# Patient Record
Sex: FEMALE | Race: WHITE | Employment: OTHER | URBAN - NONMETROPOLITAN AREA
[De-identification: names, ages, dates, MRNs, and addresses within clinical notes are randomized per-mention and may not be internally consistent; named-entity substitution may affect disease eponyms.]

---

## 2017-01-04 ENCOUNTER — TELEPHONE (OUTPATIENT)
Dept: PHYSICAL MEDICINE AND REHAB | Age: 67
End: 2017-01-04

## 2017-01-09 ENCOUNTER — OFFICE VISIT (OUTPATIENT)
Dept: UROLOGY | Age: 67
End: 2017-01-09

## 2017-01-09 VITALS
DIASTOLIC BLOOD PRESSURE: 80 MMHG | WEIGHT: 177 LBS | BODY MASS INDEX: 31.36 KG/M2 | HEIGHT: 63 IN | SYSTOLIC BLOOD PRESSURE: 120 MMHG

## 2017-01-09 DIAGNOSIS — R31.9 HEMATURIA: Primary | ICD-10-CM

## 2017-01-09 LAB
BILIRUBIN URINE: NEGATIVE
BLOOD URINE, POC: NORMAL
CHARACTER, URINE: CLEAR
COLOR, URINE: YELLOW
GLUCOSE URINE: NEGATIVE MG/DL
KETONES, URINE: NEGATIVE
LEUKOCYTE CLUMPS, URINE: NORMAL
NITRITE, URINE: POSITIVE
PH, URINE: 5
PROTEIN, URINE: NEGATIVE MG/DL
SPECIFIC GRAVITY, URINE: 1.02 (ref 1–1.03)
UROBILINOGEN, URINE: 0.2 EU/DL

## 2017-01-09 PROCEDURE — 99214 OFFICE O/P EST MOD 30 MIN: CPT | Performed by: UROLOGY

## 2017-01-09 PROCEDURE — 81003 URINALYSIS AUTO W/O SCOPE: CPT | Performed by: UROLOGY

## 2017-01-09 ASSESSMENT — ENCOUNTER SYMPTOMS
EYE REDNESS: 0
COLOR CHANGE: 0
ABDOMINAL PAIN: 0
EYE ITCHING: 0
CHEST TIGHTNESS: 0
BACK PAIN: 1
CONSTIPATION: 1
SHORTNESS OF BREATH: 0

## 2017-05-19 ENCOUNTER — PROCEDURE VISIT (OUTPATIENT)
Dept: PHYSICAL MEDICINE AND REHAB | Age: 67
End: 2017-05-19

## 2017-05-19 DIAGNOSIS — M54.2 NECK PAIN: ICD-10-CM

## 2017-05-19 DIAGNOSIS — R20.0 BILATERAL HAND NUMBNESS: ICD-10-CM

## 2017-05-19 DIAGNOSIS — G56.03 BILATERAL CARPAL TUNNEL SYNDROME: Primary | ICD-10-CM

## 2017-05-19 DIAGNOSIS — M54.12 CERVICAL RADICULOPATHY: ICD-10-CM

## 2017-05-19 PROCEDURE — 95886 MUSC TEST DONE W/N TEST COMP: CPT | Performed by: PSYCHIATRY & NEUROLOGY

## 2017-05-19 PROCEDURE — 95911 NRV CNDJ TEST 9-10 STUDIES: CPT | Performed by: PSYCHIATRY & NEUROLOGY

## 2017-06-19 ENCOUNTER — OFFICE VISIT (OUTPATIENT)
Dept: PHYSICAL MEDICINE AND REHAB | Age: 67
End: 2017-06-19

## 2017-06-19 VITALS
SYSTOLIC BLOOD PRESSURE: 126 MMHG | DIASTOLIC BLOOD PRESSURE: 79 MMHG | HEIGHT: 64 IN | WEIGHT: 183.8 LBS | HEART RATE: 76 BPM | BODY MASS INDEX: 31.38 KG/M2

## 2017-06-19 DIAGNOSIS — I69.30 HISTORY OF STROKE WITH RESIDUAL DEFICIT: Primary | ICD-10-CM

## 2017-06-19 PROCEDURE — 99213 OFFICE O/P EST LOW 20 MIN: CPT | Performed by: PSYCHIATRY & NEUROLOGY

## 2017-06-22 ENCOUNTER — TELEPHONE (OUTPATIENT)
Dept: PHYSICAL MEDICINE AND REHAB | Age: 67
End: 2017-06-22

## 2017-06-22 DIAGNOSIS — R42 DIZZINESS: Primary | ICD-10-CM

## 2017-07-10 ENCOUNTER — OFFICE VISIT (OUTPATIENT)
Dept: UROLOGY | Age: 67
End: 2017-07-10

## 2017-07-10 VITALS
BODY MASS INDEX: 31.24 KG/M2 | WEIGHT: 183 LBS | HEIGHT: 64 IN | DIASTOLIC BLOOD PRESSURE: 80 MMHG | SYSTOLIC BLOOD PRESSURE: 128 MMHG

## 2017-07-10 DIAGNOSIS — R31.9 HEMATURIA: Primary | ICD-10-CM

## 2017-07-10 LAB
BILIRUBIN URINE: NEGATIVE
BLOOD URINE, POC: NEGATIVE
CHARACTER, URINE: CLEAR
COLOR, URINE: YELLOW
GLUCOSE URINE: NEGATIVE MG/DL
KETONES, URINE: NEGATIVE
LEUKOCYTE CLUMPS, URINE: NEGATIVE
NITRITE, URINE: NEGATIVE
PH, URINE: 5
PROTEIN, URINE: NEGATIVE MG/DL
SPECIFIC GRAVITY, URINE: 1.01 (ref 1–1.03)
UROBILINOGEN, URINE: 0.2 EU/DL

## 2017-07-10 PROCEDURE — 81003 URINALYSIS AUTO W/O SCOPE: CPT | Performed by: UROLOGY

## 2017-07-10 PROCEDURE — 99214 OFFICE O/P EST MOD 30 MIN: CPT | Performed by: UROLOGY

## 2017-07-10 ASSESSMENT — ENCOUNTER SYMPTOMS
COLOR CHANGE: 0
ABDOMINAL PAIN: 0
CHEST TIGHTNESS: 0
BACK PAIN: 1
EYE REDNESS: 0
SHORTNESS OF BREATH: 0
FACIAL SWELLING: 0
EYE PAIN: 0
NAUSEA: 0

## 2017-10-05 ENCOUNTER — HOSPITAL ENCOUNTER (OUTPATIENT)
Dept: WOMENS IMAGING | Age: 67
Discharge: HOME | End: 2017-10-05
Payer: MEDICARE

## 2017-10-05 DIAGNOSIS — Z12.31 VISIT FOR SCREENING MAMMOGRAM: ICD-10-CM

## 2017-10-05 PROCEDURE — 77063 BREAST TOMOSYNTHESIS BI: CPT

## 2018-05-29 ENCOUNTER — HOSPITAL ENCOUNTER (OUTPATIENT)
Dept: MRI IMAGING | Age: 68
Discharge: HOME | End: 2018-05-29
Payer: MEDICARE

## 2018-05-29 DIAGNOSIS — R22.1 PARAPHARYNGEAL SPACE MASS: ICD-10-CM

## 2018-05-29 LAB — POC CREATININE WHOLE BLOOD: 0.7 MG/DL (ref 0.5–1.2)

## 2018-05-29 PROCEDURE — 70543 MRI ORBT/FAC/NCK W/O &W/DYE: CPT

## 2018-05-29 PROCEDURE — 6360000004 HC RX CONTRAST MEDICATION: Performed by: OTOLARYNGOLOGY

## 2018-05-29 PROCEDURE — A9579 GAD-BASE MR CONTRAST NOS,1ML: HCPCS | Performed by: OTOLARYNGOLOGY

## 2018-05-29 PROCEDURE — 82565 ASSAY OF CREATININE: CPT

## 2018-05-29 RX ADMIN — GADOTERIDOL 15 ML: 279.3 INJECTION, SOLUTION INTRAVENOUS at 13:04

## 2018-06-07 ENCOUNTER — TELEPHONE (OUTPATIENT)
Dept: UROLOGY | Age: 68
End: 2018-06-07

## 2018-07-16 ENCOUNTER — OFFICE VISIT (OUTPATIENT)
Dept: UROLOGY | Age: 68
End: 2018-07-16
Payer: MEDICARE

## 2018-07-16 VITALS
DIASTOLIC BLOOD PRESSURE: 84 MMHG | WEIGHT: 182 LBS | SYSTOLIC BLOOD PRESSURE: 120 MMHG | BODY MASS INDEX: 32.25 KG/M2 | HEIGHT: 63 IN

## 2018-07-16 DIAGNOSIS — R31.29 MICROHEMATURIA: Primary | ICD-10-CM

## 2018-07-16 DIAGNOSIS — R33.9 URINARY RETENTION: ICD-10-CM

## 2018-07-16 DIAGNOSIS — R33.9 INCOMPLETE BLADDER EMPTYING: ICD-10-CM

## 2018-07-16 DIAGNOSIS — R35.0 URINARY FREQUENCY: ICD-10-CM

## 2018-07-16 LAB
BILIRUBIN URINE: NEGATIVE
BLOOD URINE, POC: ABNORMAL
CHARACTER, URINE: CLEAR
COLOR, URINE: YELLOW
GLUCOSE URINE: NEGATIVE MG/DL
KETONES, URINE: NEGATIVE
LEUKOCYTE CLUMPS, URINE: ABNORMAL
NITRITE, URINE: NEGATIVE
PH, URINE: 5.5
POST VOID RESIDUAL (PVR): 226 ML
PROTEIN, URINE: NEGATIVE MG/DL
SPECIFIC GRAVITY, URINE: 1.01 (ref 1–1.03)
UROBILINOGEN, URINE: 0.2 EU/DL

## 2018-07-16 PROCEDURE — 81003 URINALYSIS AUTO W/O SCOPE: CPT | Performed by: NURSE PRACTITIONER

## 2018-07-16 PROCEDURE — 51798 US URINE CAPACITY MEASURE: CPT | Performed by: NURSE PRACTITIONER

## 2018-07-16 PROCEDURE — 99213 OFFICE O/P EST LOW 20 MIN: CPT | Performed by: NURSE PRACTITIONER

## 2018-07-16 NOTE — PROGRESS NOTES
Results   Component Value Date    BUN 12 09/13/2016    CREATININE 0.7 01/22/2017    CREATININE 0.6 09/13/2016       Radiology  No recent imaging       Assessment & Plan:     Incomplete Bladder emptying  Urinary retention  Microhematuria     Luba f/u today for microhematuria, hx of gross hematuria. Denies any new episodes of gross hematuria, urinalysis shows trace blood & leukocytes. Will send for culture and cytology today. PVR elevated at 226 ml. Discussed need to start straight cath TID. She is willing to perform and be taught how to do it, but she wishes to not have it done today in the office. She will record residuals in voiding diary. Re-evaluate in 6 weeks. Return in about 6 weeks (around 8/27/2018) for Incomplete bladder emptying .     PAULINO Hill  Urology

## 2018-07-17 LAB
ORGANISM: ABNORMAL
URINE CULTURE, ROUTINE: ABNORMAL

## 2018-07-18 ENCOUNTER — TELEPHONE (OUTPATIENT)
Dept: UROLOGY | Age: 68
End: 2018-07-18

## 2018-07-18 RX ORDER — SULFAMETHOXAZOLE AND TRIMETHOPRIM 800; 160 MG/1; MG/1
1 TABLET ORAL 2 TIMES DAILY
Qty: 10 TABLET | Refills: 0 | Status: SHIPPED | OUTPATIENT
Start: 2018-07-18 | End: 2018-07-23

## 2018-07-19 ENCOUNTER — TELEPHONE (OUTPATIENT)
Dept: UROLOGY | Age: 68
End: 2018-07-19

## 2018-08-20 ENCOUNTER — TELEPHONE (OUTPATIENT)
Dept: UROLOGY | Age: 68
End: 2018-08-20

## 2018-08-20 NOTE — TELEPHONE ENCOUNTER
Patient called and said that she needed to cancel her appointment today. She said that she is feeling good and not having to self cath anymore. She said that she sometimes feels an ache in her lower back and she did so a home UTI kit to see if she had an infection and it said she did. She said that she also  Was having some dental work done and her dentist put her on an antibiotic and the symptoms of the UTI went away. There  is an active order for a urine culture in the system so I am faxing it over to [facility] on [address] and she is getting it done tomorrow.

## 2018-08-25 LAB — URINE CULTURE, ROUTINE: NORMAL

## 2018-08-27 ENCOUNTER — TELEPHONE (OUTPATIENT)
Dept: UROLOGY | Age: 68
End: 2018-08-27

## 2018-11-10 ENCOUNTER — OFFICE VISIT (OUTPATIENT)
Dept: FAMILY MEDICINE CLINIC | Age: 68
End: 2018-11-10
Payer: MEDICARE

## 2018-11-10 VITALS
WEIGHT: 175 LBS | HEIGHT: 63 IN | SYSTOLIC BLOOD PRESSURE: 122 MMHG | DIASTOLIC BLOOD PRESSURE: 86 MMHG | RESPIRATION RATE: 16 BRPM | TEMPERATURE: 99.1 F | BODY MASS INDEX: 31.01 KG/M2 | HEART RATE: 76 BPM

## 2018-11-10 DIAGNOSIS — I10 ESSENTIAL HYPERTENSION: ICD-10-CM

## 2018-11-10 DIAGNOSIS — R11.0 NAUSEA: ICD-10-CM

## 2018-11-10 DIAGNOSIS — R10.9 LEFT FLANK PAIN: ICD-10-CM

## 2018-11-10 DIAGNOSIS — G47.00 INSOMNIA, UNSPECIFIED TYPE: ICD-10-CM

## 2018-11-10 DIAGNOSIS — I69.30 LATE EFFECT OF CEREBROVASCULAR ACCIDENT (CVA): ICD-10-CM

## 2018-11-10 DIAGNOSIS — Z91.81 AT HIGH RISK FOR FALLS: Primary | ICD-10-CM

## 2018-11-10 DIAGNOSIS — M15.8 OTHER OSTEOARTHRITIS INVOLVING MULTIPLE JOINTS: ICD-10-CM

## 2018-11-10 PROCEDURE — 99204 OFFICE O/P NEW MOD 45 MIN: CPT | Performed by: FAMILY MEDICINE

## 2018-11-10 RX ORDER — FLUTICASONE PROPIONATE 50 MCG
1 SPRAY, SUSPENSION (ML) NASAL DAILY
COMMUNITY
End: 2020-02-04 | Stop reason: ALTCHOICE

## 2018-11-10 RX ORDER — PROMETHAZINE HYDROCHLORIDE 25 MG/1
25 TABLET ORAL EVERY 6 HOURS PRN
Qty: 120 TABLET | Refills: 3 | Status: SHIPPED | OUTPATIENT
Start: 2018-11-10 | End: 2020-02-04 | Stop reason: ALTCHOICE

## 2018-11-10 RX ORDER — PHENAZOPYRIDINE HYDROCHLORIDE 200 MG/1
200 TABLET, FILM COATED ORAL 3 TIMES DAILY PRN
COMMUNITY
End: 2020-02-04 | Stop reason: ALTCHOICE

## 2018-11-10 RX ORDER — OXYCODONE HYDROCHLORIDE AND ACETAMINOPHEN 5; 325 MG/1; MG/1
1-2 TABLET ORAL EVERY 4 HOURS PRN
Qty: 42 TABLET | Refills: 0 | Status: CANCELLED | OUTPATIENT
Start: 2018-11-10 | End: 2018-11-24

## 2018-11-10 RX ORDER — MECLIZINE HYDROCHLORIDE 25 MG/1
25 TABLET ORAL 3 TIMES DAILY PRN
Qty: 270 TABLET | Refills: 3 | Status: SHIPPED | OUTPATIENT
Start: 2018-11-10 | End: 2020-02-04

## 2018-11-10 RX ORDER — SPIRONOLACTONE 25 MG/1
25 TABLET ORAL DAILY
Qty: 90 TABLET | Refills: 3 | Status: SHIPPED | OUTPATIENT
Start: 2018-11-10 | End: 2019-11-13 | Stop reason: SDUPTHER

## 2018-11-10 RX ORDER — ALPRAZOLAM 0.25 MG/1
0.25 TABLET ORAL NIGHTLY PRN
Qty: 90 TABLET | Refills: 0 | Status: SHIPPED | OUTPATIENT
Start: 2018-11-10 | End: 2019-02-08

## 2018-11-10 RX ORDER — SPIRONOLACTONE 25 MG/1
25 TABLET ORAL DAILY
COMMUNITY
End: 2018-11-10 | Stop reason: SDUPTHER

## 2018-11-10 RX ORDER — PROMETHAZINE HYDROCHLORIDE 25 MG/1
25 TABLET ORAL EVERY 6 HOURS PRN
COMMUNITY
End: 2018-11-10 | Stop reason: SDUPTHER

## 2018-11-10 ASSESSMENT — PATIENT HEALTH QUESTIONNAIRE - PHQ9
SUM OF ALL RESPONSES TO PHQ QUESTIONS 1-9: 0
SUM OF ALL RESPONSES TO PHQ9 QUESTIONS 1 & 2: 0
1. LITTLE INTEREST OR PLEASURE IN DOING THINGS: 0
2. FEELING DOWN, DEPRESSED OR HOPELESS: 0
SUM OF ALL RESPONSES TO PHQ QUESTIONS 1-9: 0

## 2018-11-10 ASSESSMENT — ENCOUNTER SYMPTOMS
SHORTNESS OF BREATH: 0
NAUSEA: 0
COUGH: 0
EYE REDNESS: 0
CONSTIPATION: 0
VOMITING: 0
BLOOD IN STOOL: 0
EYE DISCHARGE: 0
SORE THROAT: 0
DIARRHEA: 0
EYE PAIN: 0
WHEEZING: 0
BACK PAIN: 1

## 2018-11-19 ENCOUNTER — TELEPHONE (OUTPATIENT)
Dept: FAMILY MEDICINE CLINIC | Age: 68
End: 2018-11-19

## 2018-11-19 DIAGNOSIS — G11.9 CEREBELLAR ATAXIA (HCC): Primary | ICD-10-CM

## 2018-11-19 DIAGNOSIS — I63.9 CEREBELLAR STROKE, ACUTE (HCC): ICD-10-CM

## 2018-12-03 ENCOUNTER — TELEPHONE (OUTPATIENT)
Dept: FAMILY MEDICINE CLINIC | Age: 68
End: 2018-12-03

## 2018-12-03 ENCOUNTER — HOSPITAL ENCOUNTER (OUTPATIENT)
Dept: CT IMAGING | Age: 68
Discharge: HOME | End: 2018-12-03
Payer: MEDICARE

## 2018-12-03 DIAGNOSIS — K57.92 DIVERTICULITIS: Primary | ICD-10-CM

## 2018-12-03 DIAGNOSIS — R10.9 LEFT FLANK PAIN: ICD-10-CM

## 2018-12-03 DIAGNOSIS — R10.9 LEFT FLANK PAIN: Primary | ICD-10-CM

## 2018-12-03 PROCEDURE — 74176 CT ABD & PELVIS W/O CONTRAST: CPT

## 2018-12-03 RX ORDER — AMOXICILLIN AND CLAVULANATE POTASSIUM 875; 125 MG/1; MG/1
1 TABLET, FILM COATED ORAL 2 TIMES DAILY
Qty: 20 TABLET | Refills: 0 | Status: SHIPPED | OUTPATIENT
Start: 2018-12-03 | End: 2019-05-13 | Stop reason: SDUPTHER

## 2018-12-03 NOTE — TELEPHONE ENCOUNTER
Pt informed and verbalized understanding. Pt has had LLQ pain, she has been constipated, felt feverish and had chills off and on for months, pt doesn't have a thermometer. No N,V, or Diarrhea.  Pt states she had bright red blood in her stool 2 days ago

## 2018-12-15 ENCOUNTER — OFFICE VISIT (OUTPATIENT)
Dept: FAMILY MEDICINE CLINIC | Age: 68
End: 2018-12-15
Payer: MEDICARE

## 2018-12-15 VITALS
RESPIRATION RATE: 12 BRPM | SYSTOLIC BLOOD PRESSURE: 132 MMHG | BODY MASS INDEX: 29.37 KG/M2 | HEART RATE: 76 BPM | TEMPERATURE: 98 F | WEIGHT: 172 LBS | HEIGHT: 64 IN | DIASTOLIC BLOOD PRESSURE: 72 MMHG

## 2018-12-15 DIAGNOSIS — K57.32 DIVERTICULITIS OF LARGE INTESTINE WITHOUT PERFORATION OR ABSCESS WITHOUT BLEEDING: Primary | ICD-10-CM

## 2018-12-15 PROCEDURE — 99213 OFFICE O/P EST LOW 20 MIN: CPT | Performed by: FAMILY MEDICINE

## 2018-12-15 RX ORDER — METRONIDAZOLE 500 MG/1
500 TABLET ORAL 3 TIMES DAILY
Qty: 21 TABLET | Refills: 0 | Status: SHIPPED | OUTPATIENT
Start: 2018-12-15 | End: 2018-12-22

## 2018-12-15 RX ORDER — METRONIDAZOLE 500 MG/1
500 TABLET ORAL 3 TIMES DAILY
Qty: 21 TABLET | Refills: 0 | Status: SHIPPED | OUTPATIENT
Start: 2018-12-15 | End: 2018-12-15 | Stop reason: SDUPTHER

## 2018-12-15 RX ORDER — CIPROFLOXACIN 500 MG/1
500 TABLET, FILM COATED ORAL 2 TIMES DAILY
Qty: 14 TABLET | Refills: 0 | Status: SHIPPED | OUTPATIENT
Start: 2018-12-15 | End: 2018-12-22

## 2018-12-15 RX ORDER — CIPROFLOXACIN 500 MG/1
500 TABLET, FILM COATED ORAL 2 TIMES DAILY
Qty: 14 TABLET | Refills: 0 | Status: SHIPPED | OUTPATIENT
Start: 2018-12-15 | End: 2018-12-15 | Stop reason: SDUPTHER

## 2018-12-15 NOTE — PROGRESS NOTES
Candida Weinberg is a 80 y.o. female that presents for     Chief Complaint   Patient presents with    Results     discuss CT scan       /72   Pulse 76   Temp 98 °F (36.7 °C) (Oral)   Resp 12   Ht 5' 4\" (1.626 m)   Wt 172 lb (78 kg)   LMP 11/06/1982   BMI 29.52 kg/m²       HPI:    Patient presenting follow up of CT scan of abdomen. We were initially evaluating for kidney stones, however, findings appeared more consistent with diverticulitis. Patient had noted LLQ and flank pain as well as chills. I started her on Augmentin at that time. Patient notes that she is still having some LLQ pain. She has been having some diarrhea with the augmentin, having 3-4 loose BMs per day, but seems to be improving. Appetite is a little decreased, she is having some nausea. Notes that if she does eat, she feels bloated. No fever recently, but has some intermittent chills. CT Abd/Pelvis 12/3/18:  Impression   1. No acute intra-abdominal findings.    2. Sigmoid colon diverticula with adjacent pericolonic inflammatory stranding suspicious for acute diverticulitis.       Final report electronically signed by Dr. Humaira Barone on 12/3/2018 2:11 PM         Health Maintenance   Topic Date Due    Hepatitis C screen  1950    DTaP/Tdap/Td vaccine (1 - Tdap) 01/08/1970    Shingles Vaccine (1 of 2 - 2 Dose Series) 01/08/2001    Colon cancer screen colonoscopy  01/08/2001    Low dose CT lung screening  01/08/2006    DEXA (modify frequency per FRAX score)  01/08/2016    Pneumococcal low/med risk (1 of 2 - PCV13) 01/08/2016    Potassium monitoring  01/22/2018    Creatinine monitoring  01/22/2018    Flu vaccine (1) 09/01/2018    Breast cancer screen  10/05/2019    Lipid screen  08/08/2021       Past Medical History:   Diagnosis Date    Arthritis     Chronic back pain     Closed nondisplaced intertrochanteric fracture of left femur ([facility] [address] 8/6/2016    Coronary artery disease involving native coronary  Diabetes Father     Diabetes Sister     Diabetes Brother     Colon Polyps Brother     Cancer Paternal Grandmother         bladder    Stroke Paternal Grandfather          I have reviewed the patient's past medical history, past surgical history, allergies, medications, social and family history and I have made updates where appropriate. Review of Systems        PHYSICAL EXAM:  /72   Pulse 76   Temp 98 °F (36.7 °C) (Oral)   Resp 12   Ht 5' 4\" (1.626 m)   Wt 172 lb (78 kg)   LMP 11/06/1982   BMI 29.52 kg/m²     General Appearance: well developed and well- nourished, in no acute distress  Head: normocephalic and atraumatic  ENT: external ear and ear canal normal bilaterally, nose without deformity, nasal mucosa and turbinates normal without polyps, oropharynx normal, dentition is normal for age, no lipor gum lesions noted  Neck: supple and non-tender without mass, no thyromegaly or thyroid nodules, no cervical lymphadenopathy  Pulmonary/Chest: clear to auscultation bilaterally- no wheezes, rales or rhonchi, normal air movement, no respiratory distress or retractions  Cardiovascular: normal rate, regular rhythm, normal S1 and S2, no murmurs, rubs, clicks, or gallops, distal pulses intact  Abdomen: soft,+LLQ tenderness, non-distended, no rebound or guarding, no masses or hernias noted, no hepatospleenomegaly  Extremities: no cyanosis, clubbing or edema of the lower extremities  Psych:  Normal affect without evidence of depression oranxiety, insight and judgement are appropriate, memory appears intact  Skin: warm and dry, no rash or erythema      ASSESSMENT & PLAN  Evelyn Dallam was seen today for results. Diagnoses and all orders for this visit:    Diverticulitis of large intestine without perforation or abscess without bleeding  -     Discontinue: ciprofloxacin (CIPRO) 500 MG tablet; Take 1 tablet by mouth 2 times daily for 7 days  -     Discontinue: metroNIDAZOLE (FLAGYL) 500 MG tablet;  Take 1 tablet by mouth 3 times daily for 7 days  -     ciprofloxacin (CIPRO) 500 MG tablet; Take 1 tablet by mouth 2 times daily for 7 days  -     metroNIDAZOLE (FLAGYL) 500 MG tablet; Take 1 tablet by mouth 3 times daily for 7 days    -I suspect that she may still have the diverticular infection. Her vital signs are stable currently. Will do 1 week of cipro/flagyl. Will call in 5 days, if sx persisting, will refer over to GI.  -Patient advised to call immediately or go to ER if any worsening of symptoms      Return if symptoms worsen or fail to improve. Controlled Substances Monitoring:                     Heydi Norton received counseling on the following healthy behaviors: medication adherence  Reviewed prior labs and health maintenance. Continue current medications, diet and exercise. Discussed use, benefit, and side effects of prescribed medications. Barriers to medication compliance addressed. Patient given educational materials - see patient instructions. All patient questions answered. Patient voiced understanding.

## 2018-12-20 ENCOUNTER — TELEPHONE (OUTPATIENT)
Dept: FAMILY MEDICINE CLINIC | Age: 68
End: 2018-12-20

## 2018-12-20 NOTE — TELEPHONE ENCOUNTER
The flagyl and cause the nausea, I would recommend taking the antibiotics to completion and if the symptoms persist or worsen, please let me know.

## 2018-12-20 NOTE — TELEPHONE ENCOUNTER
Spoke to pt and she states she is doing ok and feeling a little better. She states the antibiotics have made her nauseous (she thinks it is the flagyl) but she has been taking thee meds as prescribed.

## 2019-01-07 ENCOUNTER — HOSPITAL ENCOUNTER (OUTPATIENT)
Dept: WOMENS IMAGING | Age: 69
Discharge: HOME | End: 2019-01-07
Payer: MEDICARE

## 2019-01-07 DIAGNOSIS — Z12.31 VISIT FOR SCREENING MAMMOGRAM: ICD-10-CM

## 2019-01-07 PROCEDURE — 77067 SCR MAMMO BI INCL CAD: CPT

## 2019-05-09 ENCOUNTER — HOSPITAL ENCOUNTER (OUTPATIENT)
Dept: MRI IMAGING | Age: 69
Discharge: HOME | End: 2019-05-09
Payer: MEDICARE

## 2019-05-09 DIAGNOSIS — R22.1 PARAPHARYNGEAL SPACE MASS: ICD-10-CM

## 2019-05-09 LAB — POC CREATININE WHOLE BLOOD: 0.8 MG/DL (ref 0.5–1.2)

## 2019-05-09 PROCEDURE — A9579 GAD-BASE MR CONTRAST NOS,1ML: HCPCS | Performed by: OTOLARYNGOLOGY

## 2019-05-09 PROCEDURE — 82565 ASSAY OF CREATININE: CPT

## 2019-05-09 PROCEDURE — 6360000004 HC RX CONTRAST MEDICATION: Performed by: OTOLARYNGOLOGY

## 2019-05-09 PROCEDURE — 70543 MRI ORBT/FAC/NCK W/O &W/DYE: CPT

## 2019-05-09 RX ADMIN — GADOTERIDOL 15 ML: 279.3 INJECTION, SOLUTION INTRAVENOUS at 17:00

## 2019-05-13 ENCOUNTER — OFFICE VISIT (OUTPATIENT)
Dept: FAMILY MEDICINE CLINIC | Age: 69
End: 2019-05-13
Payer: MEDICARE

## 2019-05-13 VITALS
DIASTOLIC BLOOD PRESSURE: 80 MMHG | RESPIRATION RATE: 16 BRPM | SYSTOLIC BLOOD PRESSURE: 132 MMHG | WEIGHT: 172 LBS | BODY MASS INDEX: 31.65 KG/M2 | HEIGHT: 62 IN | TEMPERATURE: 98.5 F | HEART RATE: 81 BPM

## 2019-05-13 DIAGNOSIS — K57.92 DIVERTICULITIS: ICD-10-CM

## 2019-05-13 DIAGNOSIS — I10 ESSENTIAL HYPERTENSION: ICD-10-CM

## 2019-05-13 DIAGNOSIS — G11.9 CEREBELLAR ATAXIA (HCC): Primary | ICD-10-CM

## 2019-05-13 DIAGNOSIS — R42 DIZZINESS: ICD-10-CM

## 2019-05-13 PROCEDURE — 99214 OFFICE O/P EST MOD 30 MIN: CPT | Performed by: FAMILY MEDICINE

## 2019-05-13 RX ORDER — AMOXICILLIN AND CLAVULANATE POTASSIUM 875; 125 MG/1; MG/1
1 TABLET, FILM COATED ORAL 2 TIMES DAILY
Qty: 20 TABLET | Refills: 0 | Status: SHIPPED | OUTPATIENT
Start: 2019-05-13 | End: 2019-05-23

## 2019-05-13 ASSESSMENT — PATIENT HEALTH QUESTIONNAIRE - PHQ9
2. FEELING DOWN, DEPRESSED OR HOPELESS: 0
SUM OF ALL RESPONSES TO PHQ QUESTIONS 1-9: 0
1. LITTLE INTEREST OR PLEASURE IN DOING THINGS: 0
SUM OF ALL RESPONSES TO PHQ9 QUESTIONS 1 & 2: 0
SUM OF ALL RESPONSES TO PHQ QUESTIONS 1-9: 0

## 2019-05-13 NOTE — PROGRESS NOTES
Sadia Fong is a 77 y.o. female thatpresents for Follow-up; Abdominal Pain (started  sat  HX diverticulitis - feels like that ); Bloated; and Nausea        HPI:    Patient having LLQ pain for the past 2.5 days. Notes that she is having decreased appetite with this as well. No fever. Has not had a BM for the past 3 days. States that it is consistent previous diverticulitis flares. +nausea, no vomiting. Has hx of cerebellar CVA. She has residual dizziness from this. The dizziness has been well controlled until the past several days. No recent falls. HTN    Does patient check BP regularly at home? - No  Current Medication regimen - aldactone  Tolerating medications well? - yes    Shortness of breath or chest pain? No  Headache or visual complaints? No  Neurologic changes like confusion? No  Extremity edema? No    BP Readings from Last 3 Encounters:   05/13/19 132/80   12/15/18 132/72   11/10/18 122/86         I have reviewed the patient's past medical history, past surgical history, allergies,medications, social and family history and I have made updates where appropriate.     Past Medical History:   Diagnosis Date    Arthritis     Chronic back pain     Closed nondisplaced intertrochanteric fracture of left femur ([address] 8/6/2016    Coronary artery disease involving native coronary artery of native heart 8/6/2016    Fifth cranial nerve palsy     GERD (gastroesophageal reflux disease)     Hearing loss     History of esophageal dilatation     Hyperlipidemia     Hypertension     Left foot drop     s/p lumbar spine surgery in 2011    Migraines     atypical had spasm that closed blood supply to nerve in brain    Numbness     Decreased feeling right arm left leg    PONV (postoperative nausea and vomiting)     Septal defect, heart 2016    Stroke due to occlusion of right cerebellar artery ([address] 8/7/2016    Stroke-like episode ([address]     Secondary to migraine       Past Surgical History: thyromegaly or thyroid nodules, no cervical lymphadenopathy  Pulmonary/Chest: clear to auscultation bilaterally- no wheezes, rales or rhonchi, normal air movement, no respiratory distress orretractions  Cardiovascular: normal rate, regular rhythm, normal S1 and S2, no murmurs, rubs, clicks, or gallops,distal pulses intact  Abdomen: soft, +LLQ tenderness, no rebound, non-distended, normal bowel sounds,  no rebound or guarding, nomasses or hernias noted, no hepatospleenomegaly  Extremities: no cyanosis, clubbing or edema of the lowerextremities  Musculoskeletal: No joint swelling or gross deformity   Psych:  Normal affect without evidence of depression or anxiety, insight and judgement are appropriate, memoryappears intact  Skin: warm and dry, no rash or erythema    ASSESSMENT & PLAN  Evelyn Smith was seen today for follow-up, abdominal pain, bloated and nausea. Diagnoses and all orders for this visit:    Cerebellar ataxia ([address]    Diverticulitis  -     amoxicillin-clavulanate (AUGMENTIN) 875-125 MG per tablet; Take 1 tablet by mouth 2 times daily for 10 days    Essential hypertension    Dizziness    -Abd sx consistent with diverticulitis, will start augmentin, advised on clear liquid diet. Will call in 2 days to see how she is doing.  -Patient advised to call immediately or go to ER if any worsening of symptoms  -Chronic issues are stable, continue current medications  -Advised to call if any issues      Return in about 6 months (around 11/13/2019), or if symptoms worsen or fail to improve. Controlled Substances Monitoring:     RX Monitoring 11/10/2018   Attestation The Prescription Monitoring Report for this patient was reviewed today. Chronic Pain Routine Monitoring No signs of potential drug abuse or diversion identified. Evelyn Smith received counseling on the following healthy behaviors: medication adherence  Reviewedprior labs and health maintenance.   Continue current medications, diet and exercise. Discussed use, benefit, and side effects of prescribed medications. Barriers to medication compliance addressed. Patient given educationalmaterials - see patient instructions. All patient questions answered. Patient voiced understanding.

## 2019-05-15 ENCOUNTER — HOSPITAL ENCOUNTER (EMERGENCY)
Age: 69
Discharge: HOME | End: 2019-05-15
Attending: EMERGENCY MEDICINE
Payer: MEDICARE

## 2019-05-15 ENCOUNTER — TELEPHONE (OUTPATIENT)
Dept: FAMILY MEDICINE CLINIC | Age: 69
End: 2019-05-15

## 2019-05-15 ENCOUNTER — APPOINTMENT (OUTPATIENT)
Dept: CT IMAGING | Age: 69
End: 2019-05-15
Payer: MEDICARE

## 2019-05-15 ENCOUNTER — APPOINTMENT (OUTPATIENT)
Dept: GENERAL RADIOLOGY | Age: 69
End: 2019-05-15
Payer: MEDICARE

## 2019-05-15 VITALS
TEMPERATURE: 99 F | HEIGHT: 64 IN | OXYGEN SATURATION: 96 % | HEART RATE: 66 BPM | WEIGHT: 172 LBS | SYSTOLIC BLOOD PRESSURE: 132 MMHG | BODY MASS INDEX: 29.37 KG/M2 | RESPIRATION RATE: 16 BRPM | DIASTOLIC BLOOD PRESSURE: 64 MMHG

## 2019-05-15 DIAGNOSIS — R11.0 NAUSEA: ICD-10-CM

## 2019-05-15 DIAGNOSIS — R10.32 LLQ PAIN: Primary | ICD-10-CM

## 2019-05-15 DIAGNOSIS — K57.32 SIGMOID DIVERTICULITIS: Primary | ICD-10-CM

## 2019-05-15 LAB
ALBUMIN SERPL-MCNC: 4.3 G/DL (ref 3.5–5.1)
ALP BLD-CCNC: 84 U/L (ref 38–126)
ALT SERPL-CCNC: 23 U/L (ref 11–66)
ANION GAP SERPL CALCULATED.3IONS-SCNC: 15 MEQ/L (ref 8–16)
AST SERPL-CCNC: 39 U/L (ref 5–40)
BACTERIA: ABNORMAL /HPF
BASOPHILS # BLD: 0.7 %
BASOPHILS ABSOLUTE: 0 THOU/MM3 (ref 0–0.1)
BILIRUB SERPL-MCNC: 0.4 MG/DL (ref 0.3–1.2)
BILIRUBIN DIRECT: < 0.2 MG/DL (ref 0–0.3)
BILIRUBIN URINE: NEGATIVE
BLOOD, URINE: NEGATIVE
BUN BLDV-MCNC: 9 MG/DL (ref 7–22)
CALCIUM SERPL-MCNC: 9.2 MG/DL (ref 8.5–10.5)
CASTS 2: ABNORMAL /LPF
CASTS UA: ABNORMAL /LPF
CHARACTER, URINE: CLEAR
CHLORIDE BLD-SCNC: 102 MEQ/L (ref 98–111)
CO2: 24 MEQ/L (ref 23–33)
COLOR: YELLOW
CREAT SERPL-MCNC: 0.7 MG/DL (ref 0.4–1.2)
CRYSTALS, UA: ABNORMAL
EOSINOPHIL # BLD: 1.4 %
EOSINOPHILS ABSOLUTE: 0.1 THOU/MM3 (ref 0–0.4)
EPITHELIAL CELLS, UA: ABNORMAL /HPF
ERYTHROCYTE [DISTWIDTH] IN BLOOD BY AUTOMATED COUNT: 13.6 % (ref 11.5–14.5)
ERYTHROCYTE [DISTWIDTH] IN BLOOD BY AUTOMATED COUNT: 50 FL (ref 35–45)
GFR SERPL CREATININE-BSD FRML MDRD: 83 ML/MIN/1.73M2
GLUCOSE BLD-MCNC: 102 MG/DL (ref 70–108)
GLUCOSE URINE: NEGATIVE MG/DL
HCT VFR BLD CALC: 46.5 % (ref 37–47)
HEMOGLOBIN: 15.6 GM/DL (ref 12–16)
IMMATURE GRANS (ABS): 0.01 THOU/MM3 (ref 0–0.07)
IMMATURE GRANULOCYTES: 0.2 %
KETONES, URINE: NEGATIVE
LEUKOCYTE ESTERASE, URINE: NEGATIVE
LIPASE: 12 U/L (ref 5.6–51.3)
LYMPHOCYTES # BLD: 47.7 %
LYMPHOCYTES ABSOLUTE: 2.1 THOU/MM3 (ref 1–4.8)
MCH RBC QN AUTO: 33.1 PG (ref 26–33)
MCHC RBC AUTO-ENTMCNC: 33.5 GM/DL (ref 32.2–35.5)
MCV RBC AUTO: 98.5 FL (ref 81–99)
MISCELLANEOUS 2: ABNORMAL
MONOCYTES # BLD: 11 %
MONOCYTES ABSOLUTE: 0.5 THOU/MM3 (ref 0.4–1.3)
NITRITE, URINE: NEGATIVE
NUCLEATED RED BLOOD CELLS: 0 /100 WBC
OSMOLALITY CALCULATION: 280.1 MOSMOL/KG (ref 275–300)
PH UA: 6 (ref 5–9)
PLATELET # BLD: 227 THOU/MM3 (ref 130–400)
PMV BLD AUTO: 10.2 FL (ref 9.4–12.4)
POTASSIUM REFLEX MAGNESIUM: 3.9 MEQ/L (ref 3.5–5.2)
PROTEIN UA: NEGATIVE
RBC # BLD: 4.72 MILL/MM3 (ref 4.2–5.4)
RBC URINE: ABNORMAL /HPF
RENAL EPITHELIAL, UA: ABNORMAL
SEG NEUTROPHILS: 39 %
SEGMENTED NEUTROPHILS ABSOLUTE COUNT: 1.7 THOU/MM3 (ref 1.8–7.7)
SODIUM BLD-SCNC: 141 MEQ/L (ref 135–145)
SPECIFIC GRAVITY, URINE: > 1.03 (ref 1–1.03)
TOTAL PROTEIN: 7.3 G/DL (ref 6.1–8)
UROBILINOGEN, URINE: 1 EU/DL (ref 0–1)
WBC # BLD: 4.4 THOU/MM3 (ref 4.8–10.8)
WBC UA: ABNORMAL /HPF
YEAST: ABNORMAL

## 2019-05-15 PROCEDURE — 2580000003 HC RX 258: Performed by: EMERGENCY MEDICINE

## 2019-05-15 PROCEDURE — 85025 COMPLETE CBC W/AUTO DIFF WBC: CPT

## 2019-05-15 PROCEDURE — 71046 X-RAY EXAM CHEST 2 VIEWS: CPT

## 2019-05-15 PROCEDURE — 83690 ASSAY OF LIPASE: CPT

## 2019-05-15 PROCEDURE — 80048 BASIC METABOLIC PNL TOTAL CA: CPT

## 2019-05-15 PROCEDURE — 74177 CT ABD & PELVIS W/CONTRAST: CPT

## 2019-05-15 PROCEDURE — 81001 URINALYSIS AUTO W/SCOPE: CPT

## 2019-05-15 PROCEDURE — 96372 THER/PROPH/DIAG INJ SC/IM: CPT

## 2019-05-15 PROCEDURE — 99284 EMERGENCY DEPT VISIT MOD MDM: CPT

## 2019-05-15 PROCEDURE — 6360000002 HC RX W HCPCS: Performed by: EMERGENCY MEDICINE

## 2019-05-15 PROCEDURE — 36415 COLL VENOUS BLD VENIPUNCTURE: CPT

## 2019-05-15 PROCEDURE — 80076 HEPATIC FUNCTION PANEL: CPT

## 2019-05-15 PROCEDURE — 6360000004 HC RX CONTRAST MEDICATION: Performed by: EMERGENCY MEDICINE

## 2019-05-15 PROCEDURE — 6370000000 HC RX 637 (ALT 250 FOR IP): Performed by: EMERGENCY MEDICINE

## 2019-05-15 RX ORDER — METRONIDAZOLE 500 MG/1
500 TABLET ORAL ONCE
Status: COMPLETED | OUTPATIENT
Start: 2019-05-15 | End: 2019-05-15

## 2019-05-15 RX ORDER — ONDANSETRON 2 MG/ML
4 INJECTION INTRAMUSCULAR; INTRAVENOUS EVERY 30 MIN PRN
Status: DISCONTINUED | OUTPATIENT
Start: 2019-05-15 | End: 2019-05-15

## 2019-05-15 RX ORDER — MORPHINE SULFATE 4 MG/ML
4 INJECTION, SOLUTION INTRAMUSCULAR; INTRAVENOUS
Status: DISCONTINUED | OUTPATIENT
Start: 2019-05-15 | End: 2019-05-16 | Stop reason: HOSPADM

## 2019-05-15 RX ORDER — PROMETHAZINE HYDROCHLORIDE 25 MG/ML
25 INJECTION, SOLUTION INTRAMUSCULAR; INTRAVENOUS ONCE
Status: COMPLETED | OUTPATIENT
Start: 2019-05-15 | End: 2019-05-15

## 2019-05-15 RX ORDER — SODIUM CHLORIDE 9 MG/ML
INJECTION, SOLUTION INTRAVENOUS CONTINUOUS
Status: DISCONTINUED | OUTPATIENT
Start: 2019-05-15 | End: 2019-05-16 | Stop reason: HOSPADM

## 2019-05-15 RX ORDER — CIPROFLOXACIN 500 MG/1
500 TABLET, FILM COATED ORAL ONCE
Status: COMPLETED | OUTPATIENT
Start: 2019-05-15 | End: 2019-05-15

## 2019-05-15 RX ORDER — 0.9 % SODIUM CHLORIDE 0.9 %
1000 INTRAVENOUS SOLUTION INTRAVENOUS ONCE
Status: COMPLETED | OUTPATIENT
Start: 2019-05-15 | End: 2019-05-15

## 2019-05-15 RX ORDER — TRAMADOL HYDROCHLORIDE 50 MG/1
50 TABLET ORAL EVERY 6 HOURS PRN
Qty: 10 TABLET | Refills: 0 | Status: SHIPPED | OUTPATIENT
Start: 2019-05-15 | End: 2019-05-18

## 2019-05-15 RX ADMIN — CIPROFLOXACIN 500 MG: 500 TABLET, FILM COATED ORAL at 21:45

## 2019-05-15 RX ADMIN — SODIUM CHLORIDE 1000 ML: 9 INJECTION, SOLUTION INTRAVENOUS at 18:04

## 2019-05-15 RX ADMIN — MORPHINE SULFATE 4 MG: 4 INJECTION INTRAVENOUS at 21:45

## 2019-05-15 RX ADMIN — PROMETHAZINE HYDROCHLORIDE 25 MG: 25 INJECTION INTRAMUSCULAR; INTRAVENOUS at 18:04

## 2019-05-15 RX ADMIN — IOPAMIDOL 80 ML: 755 INJECTION, SOLUTION INTRAVENOUS at 19:23

## 2019-05-15 RX ADMIN — METRONIDAZOLE 500 MG: 500 TABLET, FILM COATED ORAL at 21:45

## 2019-05-15 ASSESSMENT — ENCOUNTER SYMPTOMS
CONSTIPATION: 0
ABDOMINAL PAIN: 1
WHEEZING: 0
COUGH: 0
DIARRHEA: 0
NAUSEA: 1
SHORTNESS OF BREATH: 0
BLOOD IN STOOL: 0
BACK PAIN: 0
VOMITING: 0
SORE THROAT: 0

## 2019-05-15 ASSESSMENT — PAIN SCALES - GENERAL
PAINLEVEL_OUTOF10: 5
PAINLEVEL_OUTOF10: 5
PAINLEVEL_OUTOF10: 8

## 2019-05-15 NOTE — ED TRIAGE NOTES
Patient has had abdominal pain x5 days. Saw PCP yesterday who was going to schedule outpatient testing. She states she hasn't been eating or drinking so she came to the ED for evaluation of dehydration. The patient states it would be at least a week to get testing done. She states PCP told her to come to the ED for hydration.

## 2019-05-15 NOTE — TELEPHONE ENCOUNTER
Spoke to pt and she states she is feeling the same and her sxs have not changed. She is still in pain and nauseous. She has been taking the augmentin. She has been taking leftover phenergan with sone relief.

## 2019-05-15 NOTE — ED NOTES
Spoke with CT regarding scan.  Per Dr. Chava Stahl, patient does not need oral contrast today     Corry Pedro RN  05/15/19 [id number]

## 2019-05-15 NOTE — ED PROVIDER NOTES
[facility]     eMERGENCY dEPARTMENT eNCOUnter         Pt Name: Lesley Garcia  MRN: [id number]  [place] 1950  Date of evaluation: 5/15/2019  Provider: Lory Quintero MD    CHIEF COMPLAINT       Chief Complaint   Patient presents with    Abdominal Pain       Nurses Notes reviewed and I agree except as noted in the HPI. HISTORY OF PRESENT ILLNESS    Lesley Garcia is a 77 y.o. female with a past medical history of CAD, GERD, hyperlipidemia, hypertension and stroke who presents to the ED for the evaluation of abdominal pain onset 5 days. Patient states she saw Dr. Mitzi Lundberg (PCP) and he was going to schedule a CT and lab work. She states she had Diverticulitis 6 months ago but she denies this pain feeling similar. She reports associated nausea, pale stools, and difficulty with urination. Patient states she has also been dizzy but states that is normal since her stroke. Patient denies vomiting, diarrhea, blood in her stool or urine, fever, chills, chest pain or shortness of breath. Patient states she has seasonal allergies but denies taking a daily antihistamine. No further complaints at the time of the initial encounter. This HPI was provided by the patient. REVIEW OF SYSTEMS     Review of Systems   Constitutional: Negative for chills, fever and unexpected weight change. HENT: Negative for congestion, ear pain and sore throat. Eyes: Negative for visual disturbance. Respiratory: Negative for cough, shortness of breath and wheezing. Cardiovascular: Negative for chest pain, palpitations and leg swelling. Gastrointestinal: Positive for abdominal pain and nausea. Negative for blood in stool, constipation, diarrhea and vomiting. Pale stool   Genitourinary: Positive for difficulty urinating. Negative for dysuria, frequency, hematuria and urgency. Musculoskeletal: Negative for arthralgias and back pain. Skin: Negative for rash.    Allergic/Immunologic: mouth every 6 hours as needed for Nausea, Disp-120 tablet, R-3Normal      spironolactone (ALDACTONE) 25 MG tablet Take 1 tablet by mouth daily, Disp-90 tablet, R-3Normal      meclizine (ANTIVERT) 25 MG tablet Take 1 tablet by mouth 3 times daily as needed for Dizziness, Disp-270 tablet, R-3Normal      Magnesium 100 MG CAPS Take by mouthHistorical Med      pitavastatin (LIVALO) 2 MG TABS tablet Take by mouth nightly      senna (SENOKOT) 8.6 MG tablet Take 1 tablet by mouth 2 times daily      Cholecalciferol (VITAMIN D3) 5000 UNITS TABS Take by mouth daily      Omega-3 Fatty Acids (OMEGA 3 PO) Take by mouth daily      polyethylene glycol (GLYCOLAX) powder Take 17 g by mouth daily      folic acid (FOLVITE) 1 MG tablet Take 1 tablet by mouth daily, Disp-30 tablet, R-3      docusate (COLACE, DULCOLAX) 100 MG CAPS Take 100 mg by mouth 2 times daily, Disp-60 capsule, R-0      esomeprazole Magnesium (NEXIUM) 20 MG PACK Take 40 mg by mouth every evening       nitroGLYCERIN (NITROSTAT) 0.4 MG SL tablet Place 0.4 mg under the tongue every 5 minutes as needed for Chest pain. Coenzyme Q10 200 MG CAPS Take  by mouth daily. aspirin 81 MG EC tablet Take 81 mg by mouth nightly. Methocarbamol (ROBAXIN-750 PO)   Take 1 tablet by mouth 3 times daily as needed              ALLERGIES     is allergic to zofran [ondansetron hcl]. FAMILY HISTORY     indicated that her mother is alive. She indicated that her father is . She indicated that her sister is alive. She indicated that her brother is alive. She indicated that her maternal grandmother is . She indicated that her maternal grandfather is . She indicated that her paternal grandmother is . She indicated that her paternal grandfather is .    family history includes Alzheimer's Disease in her mother; Cancer in her paternal grandmother; Cancer (age of onset: 78) in her father; Colon Cancer (age of onset: 79) in her father; Colon Polyps in her brother; Diabetes in her brother, father, mother, and sister; Heart Disease in her father and mother; High Blood Pressure in her mother; Lung Cancer (age of onset: 80) in her father; Stroke in her paternal grandfather. SOCIAL HISTORY      reports that she quit smoking about 2 years ago. Her smoking use included cigarettes. She has a 33.75 pack-year smoking history. She has never used smokeless tobacco. She reports that she drinks alcohol. She reports that she does not use drugs. PHYSICAL EXAM       ED Triage Vitals   BP Temp Temp Source Pulse Resp SpO2 Height Weight   05/15/19 1705 05/15/19 1701 05/15/19 1701 05/15/19 1701 05/15/19 1701 05/15/19 1701 05/15/19 1701 05/15/19 1701   (!) 144/87 99 °F (37.2 °C) Oral 74 16 99 % 5' 4\" (1.626 m) 172 lb (78 kg)      Physical Exam   Constitutional: She is oriented to person, place, and time. Vital signs are normal. She appears well-developed and well-nourished. She is cooperative. Non-toxic appearance. She does not appear ill. HENT:   Head: Normocephalic. Right Ear: External ear normal. No drainage. Left Ear: External ear normal. No drainage. Nose: Nose normal. No epistaxis. Mouth/Throat: Mucous membranes are not dry and not cyanotic. Eyes: Conjunctivae and EOM are normal. Right eye exhibits no discharge. Left eye exhibits no discharge. No scleral icterus. Neck: Trachea normal, normal range of motion and phonation normal. Neck supple. No tracheal deviation present. Cardiovascular: Normal rate, regular rhythm, normal heart sounds and intact distal pulses. Exam reveals no gallop and no friction rub. No murmur heard. Pulses:       Radial pulses are 2+ on the right side. Pulmonary/Chest: Effort normal and breath sounds normal. No stridor. No respiratory distress. She has no wheezes. She has no rales. She exhibits no tenderness. Abdominal: Soft. Bowel sounds are normal. She exhibits no distension and no pulsatile midline mass.  There is no tenderness. There is no rebound and no guarding. Musculoskeletal: Normal range of motion. She exhibits no edema or tenderness (No calf pain or tenderness. No evidence of DVT). Neurological: She is alert and oriented to person, place, and time. She has normal strength. She displays no tremor. She displays no seizure activity. Coordination normal. GCS eye subscore is 4. GCS verbal subscore is 5. GCS motor subscore is 6. Skin: Skin is warm and dry. No rash (on exposed surfaced) noted. She is not diaphoretic. No cyanosis or erythema. No pallor. Psychiatric: She has a normal mood and affect. Her speech is normal and behavior is normal.   Nursing note and vitals reviewed. DIFFERENTIAL DIAGNOSIS:   Dehydration, gastroenteritis, GERD, pancreatitis, diverticulitis     DIAGNOSTIC RESULTS     RADIOLOGY: non-plain film images(s) such as CT, Ultrasound and MRI are read by the radiologist.    CT ABDOMEN PELVIS W IV CONTRAST Additional Contrast? None (4 doses)   Final Result   Moderate stool throughout the colon. Diverticulosis. Fluid in the proximal colon. Mild pericolonic infiltration in the sigmoid colon with wall thickening. Findings concerning for sigmoid diverticulitis. No abscess         **This report has been created using voice recognition software. It may contain minor errors which are inherent in voice recognition technology. **      Final report electronically signed by Dr. Anaya Urias on 5/15/2019 8:22 PM      XR CHEST STANDARD (2 VW)   Final Result    IMPRESSION:      No acute findings. **This report has been created using voice recognition software. It may contain minor errors which are inherent in voice recognition technology. **      Final report electronically signed by Dr. Anaya Urias on 5/15/2019 6:38 PM          [x] Visualized and interpreted by me   [x] Radiologist's Wet Read Report Reviewed   [] Discussed with Radiologist.    Tamiko Blum:   Results for orders placed or performed during the hospital encounter of 05/15/19   CBC Auto Differential   Result Value Ref Range    WBC 4.4 (L) 4.8 - 10.8 thou/mm3    RBC 4.72 4.20 - 5.40 mill/mm3    Hemoglobin 15.6 12.0 - 16.0 gm/dl    Hematocrit 46.5 37.0 - 47.0 %    MCV 98.5 81.0 - 99.0 fL    MCH 33.1 (H) 26.0 - 33.0 pg    MCHC 33.5 32.2 - 35.5 gm/dl    RDW-CV 13.6 11.5 - 14.5 %    RDW-SD 50.0 (H) 35.0 - 45.0 fL    Platelets 182 878 - 673 thou/mm3    MPV 10.2 9.4 - 12.4 fL    Seg Neutrophils 39.0 %    Lymphocytes 47.7 %    Monocytes 11.0 %    Eosinophils 1.4 %    Basophils 0.7 %    Immature Granulocytes 0.2 %    Segs Absolute 1.7 (L) 1.8 - 7.7 thou/mm3    Lymphocytes # 2.1 1.0 - 4.8 thou/mm3    Monocytes # 0.5 0.4 - 1.3 thou/mm3    Eosinophils # 0.1 0.0 - 0.4 thou/mm3    Basophils # 0.0 0.0 - 0.1 thou/mm3    Immature Grans (Abs) 0.01 0.00 - 0.07 thou/mm3    nRBC 0 /100 wbc   Basic Metabolic Panel w/ Reflex to MG   Result Value Ref Range    Sodium 141 135 - 145 meq/L    Potassium reflex Magnesium 3.9 3.5 - 5.2 meq/L    Chloride 102 98 - 111 meq/L    CO2 24 23 - 33 meq/L    Glucose 102 70 - 108 mg/dL    BUN 9 7 - 22 mg/dL    CREATININE 0.7 0.4 - 1.2 mg/dL    Calcium 9.2 8.5 - 10.5 mg/dL   Hepatic Function Panel   Result Value Ref Range    Alb 4.3 3.5 - 5.1 g/dL    Total Bilirubin 0.4 0.3 - 1.2 mg/dL    Bilirubin, Direct <0.2 0.0 - 0.3 mg/dL    Alkaline Phosphatase 84 38 - 126 U/L    AST 39 5 - 40 U/L    ALT 23 11 - 66 U/L    Total Protein 7.3 6.1 - 8.0 g/dL   Lipase   Result Value Ref Range    Lipase 12.0 5.6 - 51.3 U/L   Anion Gap   Result Value Ref Range    Anion Gap 15.0 8.0 - 16.0 meq/L   Osmolality   Result Value Ref Range    Osmolality Calc 280.1 275.0 - 300 mOsmol/kg   Glomerular Filtration Rate, Estimated   Result Value Ref Range    Est, Glom Filt Rate 83 (A) ml/min/1.73m2   Urine with Reflexed Micro   Result Value Ref Range    Glucose, Ur NEGATIVE NEGATIVE mg/dl    Bilirubin Urine NEGATIVE NEGATIVE    Ketones, Urine NEGATIVE NEGATIVE    Specific Gravity, Urine > 1.030 (A) 1.002 - 1.03    Blood, Urine NEGATIVE NEGATIVE    pH, UA 6.0 5.0 - 9.0    Protein, UA NEGATIVE NEGATIVE    Urobilinogen, Urine 1.0 0.0 - 1.0 eu/dl    Nitrite, Urine NEGATIVE NEGATIVE    Leukocyte Esterase, Urine NEGATIVE NEGATIVE    Color, UA YELLOW STRAW-YELL    Character, Urine CLEAR CLEAR-SL C    RBC, UA 0-2 0-2/hpf /hpf    WBC, UA 0-2 0-4/hpf /hpf    Epi Cells 0-2 3-5/hpf /hpf    Bacteria, UA NONE FEW/NONE S /hpf    Casts UA NONE SEEN NONE SEEN /lpf    Crystals OXALATE NONE SEEN    Renal Epithelial, Urine NONE SEEN NONE SEEN    Yeast, UA NONE SEEN NONE SEEN    CASTS 2 NONE SEEN NONE SEEN /lpf    MISCELLANEOUS 2 NONE SEEN        EMERGENCY DEPARTMENT COURSE:   Vitals:    Vitals:    05/15/19 1807 05/15/19 1910 05/15/19 2046 05/15/19 2149   BP: 108/81 111/62  132/64   Pulse: 67 63 95 66   Resp: 16 16 16    Temp:       TempSrc:       SpO2: 98% 99% 100% 96%   Weight:       Height:           Orders Placed This Encounter   Medications    0.9 % sodium chloride bolus    DISCONTD: 0.9 % sodium chloride infusion    DISCONTD: morphine injection 4 mg    DISCONTD: ondansetron (ZOFRAN) injection 4 mg    promethazine (PHENERGAN) injection 25 mg    iopamidol (ISOVUE-370) 76 % injection 80 mL    ciprofloxacin (CIPRO) tablet 500 mg    metroNIDAZOLE (FLAGYL) tablet 500 mg    traMADol (ULTRAM) 50 MG tablet     Sig: Take 1 tablet by mouth every 6 hours as needed for Pain for up to 10 doses. Dispense:  10 tablet     Refill:  0       5:44 PM: Abdominal CT was ordered due to patient's history of Diverticulitis. 10:16 PM: Discussed results, diagnosis and plan with the patient. Questions were addressed. Disposition and follow-up were agreed upon. Specific discharge instructions explained, including reasons to return to the emergency department. The patient presents to the ED with complaints of abdominal pain. The patient was not in any acute distress.  The patient's physical exam revealed her abdomen to be soft and non-tender. Heart sounds were normal. Lung sounds were clear throughout. Within the department, the patient was treated with Cipro, Flagyl, Phenergan, IV fluids and Morphine. Patient's status improved during the duration of their stay. Labs and imaging were ordered, and reviewed with the patient. UA showed no signs of infection. WBC count was 4.4. Rest of labs were reassuring. CXR revealed no acute findings. Abdominal CT was ordered due to patient's history of Diverticulitis. CT abdomen and pelvis with IV contrast revealed moderate stool throughout the colon. Diverticulosis. Fluid in the proximal colon. Mild pericolonic infiltration in the sigmoid colon with wall thickening. Findings concerning for sigmoid diverticulitis. No abscess. I explained my proposed course of treatment with the patient, and she was amenable to my decision. The patient will be discharged home with Ultram, and will need to follow-up with Alana Wayne DO in 1 week. The patient will need to come back to the ER if their symptoms worsen, or become more severe in nature. CRITICAL CARE:  None    CONSULTS:  None    PROCEDURES:  None. FINAL IMPRESSION      1. Sigmoid diverticulitis          DISPOSITION/PLAN   Patient was discharged in stable condition. Will return if symptoms change or worsen, or for any sign or symptom deemed emergent by the patient or family members. Follow up as an outpatient, sooner if symptoms warrant. [facility]  [facility]  [phone]    Schedule an appointment as soon as possible for a visit in 4 week  For [facility]  [address]    Return If Symptoms Worsen      DISCHARGE MEDICATIONS:  Discharge Medication List as of 5/15/2019 10:14 PM      START taking these medications    Details   traMADol (ULTRAM) 50 MG tablet Take 1 tablet by mouth every 6 hours as needed for Pain for up to 10 doses. , Disp-10 tablet, R-0Print             Scribe:  Huong Bowling 5/15/19 5:28 PM Scribing for and in the presence of Dr. Caterina Garcia M.D. Signed by: Fareed Lopez, 05/16/19 1:32 AM    Provider:  I personally performed the services described in the documentation, reviewed and edited the documentation which was dictated to the scribe in my presence, and it accurately records my words and actions.     Dr. Caterina Garcia M.D 5/15/19 1:32 AM            Caterina Garcia MD  05/16/19 [id number]

## 2019-05-16 NOTE — ED NOTES
Discharge instructions reviewed. New medications reviewed. She was instructed to follow up with PCP in 1 week or return to ED for new or worsening symptoms. She verbalized understanding and signed.      Alexandr Brooks RN  05/15/19 1635

## 2019-05-21 ENCOUNTER — TELEPHONE (OUTPATIENT)
Dept: FAMILY MEDICINE CLINIC | Age: 69
End: 2019-05-21

## 2019-05-21 NOTE — TELEPHONE ENCOUNTER
2nd attempt to contact the pt re:overdue labs Dr Yousuf Roberts ordered on 11/19/18. HIPAA form is up to date, order mailed.

## 2019-06-29 LAB
CREAT SERPL-MCNC: 0.7 MG/DL (ref 0.4–1)
EGFR AFRICAN AMERICAN: >60 ML/MIN/1.73SQ.M
EGFR IF NONAFRICAN AMERICAN: >60 ML/MIN/1.73SQ.M

## 2019-07-26 ENCOUNTER — HOSPITAL ENCOUNTER (OUTPATIENT)
Dept: MRI IMAGING | Age: 69
Discharge: HOME | End: 2019-07-26
Payer: MEDICARE

## 2019-07-26 DIAGNOSIS — H53.2 DIPLOPIA: ICD-10-CM

## 2019-07-26 PROCEDURE — 6360000004 HC RX CONTRAST MEDICATION: Performed by: OPHTHALMOLOGY

## 2019-07-26 PROCEDURE — 70553 MRI BRAIN STEM W/O & W/DYE: CPT

## 2019-07-26 PROCEDURE — A9579 GAD-BASE MR CONTRAST NOS,1ML: HCPCS | Performed by: OPHTHALMOLOGY

## 2019-07-26 RX ADMIN — GADOTERIDOL 15 ML: 279.3 INJECTION, SOLUTION INTRAVENOUS at 21:39

## 2019-08-27 ENCOUNTER — OFFICE VISIT (OUTPATIENT)
Dept: FAMILY MEDICINE CLINIC | Age: 69
End: 2019-08-27
Payer: MEDICARE

## 2019-08-27 ENCOUNTER — TELEPHONE (OUTPATIENT)
Dept: FAMILY MEDICINE CLINIC | Age: 69
End: 2019-08-27

## 2019-08-27 VITALS
DIASTOLIC BLOOD PRESSURE: 72 MMHG | HEART RATE: 72 BPM | WEIGHT: 172 LBS | SYSTOLIC BLOOD PRESSURE: 124 MMHG | RESPIRATION RATE: 12 BRPM | HEIGHT: 63 IN | BODY MASS INDEX: 30.48 KG/M2 | TEMPERATURE: 98.1 F

## 2019-08-27 DIAGNOSIS — K57.92 DIVERTICULITIS: Primary | ICD-10-CM

## 2019-08-27 PROCEDURE — 99213 OFFICE O/P EST LOW 20 MIN: CPT | Performed by: FAMILY MEDICINE

## 2019-08-27 RX ORDER — CIPROFLOXACIN 500 MG/1
500 TABLET, FILM COATED ORAL 2 TIMES DAILY
Qty: 20 TABLET | Refills: 0 | Status: SHIPPED | OUTPATIENT
Start: 2019-08-27 | End: 2019-09-06

## 2019-08-27 RX ORDER — METRONIDAZOLE 500 MG/1
500 TABLET ORAL 3 TIMES DAILY
Qty: 30 TABLET | Refills: 0 | Status: SHIPPED | OUTPATIENT
Start: 2019-08-27 | End: 2019-09-06

## 2019-08-27 NOTE — PROGRESS NOTES
SUBJECTIVE:    Tanya Corley is a 77 y.o. y/o female that presents with Diarrhea (6 times  yesterday  times 2 today . watery ); Abdominal Pain (left lower abd  started yesterday ); and Discuss Labs (MRI  Brain )  . HPI:   Has had multiple episodes of diverticulitis in the past, states that this feels similar. Symptoms have been present for 2 day(s). Location:  LL[facility]    Description: cramping   Provoking Factors - eating  Alleviating Factors - nothing thus far  Severity - 'it gets bad'   Radiation: No    Change in pain with eating? Yes - worsens  Change in pain with BM? Yes - states that it mildly improved  Nausea? no  Vomiting? no  Diarrhea? yes  Constipation? no  Blood in Stools?  yes  Dysuria/Change in Urinary Frequency/Hematuria? no  Back Pain? no      Review of Systems  Constitutional:   Negative for  chills, fever and changes in weight      OBJECTIVE:  /72   Pulse 72   Temp 98.1 °F (36.7 °C) (Oral)   Resp 12   Ht 5' 2.5\" (1.588 m)   Wt 172 lb (78 kg)   LMP 1982   BMI 30.96 kg/m²   General Appearance: well developed and well- nourished, in no acute distress  HEENT: NCAT, nasal mucosa normal and patent, mucous membranes moist, pharynx normal without lesions, neck supple without masses  Neck: supple and non-tender without mass, no thyromegaly or thyroid nodules, no cervical lymphadenopathy  Pulmonary/Chest: clear to auscultation bilaterally- no wheezes, rales or rhonchi, normal air movement, no respiratory distress  Cardiovascular: normal rate, regular rhythm, normal S1 and S2, no murmurs, rubs, clicks, or gallops, distal pulses intact, no carotid bruits  Abdomen: soft, +LLQ tenderness, non-distended, normal bowel sounds, no masses or organomegaly, no rebound or guarding  Extremities: no cyanosis, clubbing or edema  Musculoskeletal: No joint swelling or gross deformity   Psych:  Normal affect without evidence of depression or anxiety  Skin: warm and dry, no rash or

## 2019-10-07 ENCOUNTER — HOSPITAL ENCOUNTER (OUTPATIENT)
Age: 69
Discharge: HOME | End: 2019-10-07
Payer: MEDICARE

## 2019-10-07 DIAGNOSIS — R19.4 CHANGE IN BOWEL HABIT: ICD-10-CM

## 2019-10-07 DIAGNOSIS — R10.30 LOWER ABDOMINAL PAIN: ICD-10-CM

## 2019-10-07 DIAGNOSIS — Z87.19 HISTORY OF DIVERTICULITIS: ICD-10-CM

## 2019-10-07 LAB
ANION GAP SERPL CALCULATED.3IONS-SCNC: 14 MEQ/L (ref 8–16)
BUN BLDV-MCNC: 13 MG/DL (ref 7–22)
CALCIUM SERPL-MCNC: 9.7 MG/DL (ref 8.5–10.5)
CHLORIDE BLD-SCNC: 102 MEQ/L (ref 98–111)
CO2: 26 MEQ/L (ref 23–33)
CREAT SERPL-MCNC: 0.7 MG/DL (ref 0.4–1.2)
ERYTHROCYTE [DISTWIDTH] IN BLOOD BY AUTOMATED COUNT: 14.1 % (ref 11.5–14.5)
ERYTHROCYTE [DISTWIDTH] IN BLOOD BY AUTOMATED COUNT: 51.9 FL (ref 35–45)
GFR SERPL CREATININE-BSD FRML MDRD: 83 ML/MIN/1.73M2
GLUCOSE BLD-MCNC: 95 MG/DL (ref 70–108)
HCT VFR BLD CALC: 46.4 % (ref 37–47)
HEMOGLOBIN: 15.3 GM/DL (ref 12–16)
MCH RBC QN AUTO: 33.1 PG (ref 26–33)
MCHC RBC AUTO-ENTMCNC: 33 GM/DL (ref 32.2–35.5)
MCV RBC AUTO: 100.4 FL (ref 81–99)
PLATELET # BLD: 268 THOU/MM3 (ref 130–400)
PMV BLD AUTO: 9.9 FL (ref 9.4–12.4)
POTASSIUM SERPL-SCNC: 4.5 MEQ/L (ref 3.5–5.2)
RBC # BLD: 4.62 MILL/MM3 (ref 4.2–5.4)
SODIUM BLD-SCNC: 142 MEQ/L (ref 135–145)
WBC # BLD: 7.1 THOU/MM3 (ref 4.8–10.8)

## 2019-10-07 PROCEDURE — 36415 COLL VENOUS BLD VENIPUNCTURE: CPT

## 2019-10-07 PROCEDURE — 85027 COMPLETE CBC AUTOMATED: CPT

## 2019-10-07 PROCEDURE — 80048 BASIC METABOLIC PNL TOTAL CA: CPT

## 2019-11-13 DIAGNOSIS — I10 ESSENTIAL HYPERTENSION: ICD-10-CM

## 2019-11-13 RX ORDER — SPIRONOLACTONE 25 MG/1
TABLET ORAL
Qty: 90 TABLET | Refills: 3 | Status: SHIPPED | OUTPATIENT
Start: 2019-11-13 | End: 2020-11-09

## 2019-12-27 ENCOUNTER — HOSPITAL ENCOUNTER (OUTPATIENT)
Age: 69
Discharge: HOME | End: 2019-12-27
Payer: MEDICARE

## 2019-12-27 ENCOUNTER — HOSPITAL ENCOUNTER (OUTPATIENT)
Dept: CT IMAGING | Age: 69
Discharge: HOME | End: 2019-12-27
Payer: MEDICARE

## 2019-12-27 DIAGNOSIS — R10.811 RIGHT UPPER QUADRANT ABDOMINAL TENDERNESS WITHOUT REBOUND TENDERNESS: ICD-10-CM

## 2019-12-27 DIAGNOSIS — R10.10 PAIN OF UPPER ABDOMEN: ICD-10-CM

## 2019-12-27 DIAGNOSIS — R11.0 NAUSEA: ICD-10-CM

## 2019-12-27 DIAGNOSIS — R19.5 CLAY-COLORED STOOLS: ICD-10-CM

## 2019-12-27 LAB
ALBUMIN SERPL-MCNC: 4.2 G/DL (ref 3.5–5.1)
ALP BLD-CCNC: 77 U/L (ref 38–126)
ALT SERPL-CCNC: 15 U/L (ref 11–66)
AMYLASE: 33 U/L (ref 20–104)
ANION GAP SERPL CALCULATED.3IONS-SCNC: 15 MEQ/L (ref 8–16)
AST SERPL-CCNC: 24 U/L (ref 5–40)
BILIRUB SERPL-MCNC: 0.4 MG/DL (ref 0.3–1.2)
BILIRUBIN DIRECT: < 0.2 MG/DL (ref 0–0.3)
BUN BLDV-MCNC: 12 MG/DL (ref 7–22)
CALCIUM SERPL-MCNC: 9.6 MG/DL (ref 8.5–10.5)
CHLORIDE BLD-SCNC: 103 MEQ/L (ref 98–111)
CO2: 25 MEQ/L (ref 23–33)
CREAT SERPL-MCNC: 0.7 MG/DL (ref 0.4–1.2)
ERYTHROCYTE [DISTWIDTH] IN BLOOD BY AUTOMATED COUNT: 14 % (ref 11.5–14.5)
ERYTHROCYTE [DISTWIDTH] IN BLOOD BY AUTOMATED COUNT: 53.2 FL (ref 35–45)
GFR SERPL CREATININE-BSD FRML MDRD: 83 ML/MIN/1.73M2
GLUCOSE BLD-MCNC: 89 MG/DL (ref 70–108)
HCT VFR BLD CALC: 45.7 % (ref 37–47)
HEMOGLOBIN: 14.8 GM/DL (ref 12–16)
LIPASE: 12.9 U/L (ref 5.6–51.3)
MCH RBC QN AUTO: 33.2 PG (ref 26–33)
MCHC RBC AUTO-ENTMCNC: 32.4 GM/DL (ref 32.2–35.5)
MCV RBC AUTO: 102.5 FL (ref 81–99)
PLATELET # BLD: 266 THOU/MM3 (ref 130–400)
PMV BLD AUTO: 10.4 FL (ref 9.4–12.4)
POTASSIUM SERPL-SCNC: 4 MEQ/L (ref 3.5–5.2)
RBC # BLD: 4.46 MILL/MM3 (ref 4.2–5.4)
SODIUM BLD-SCNC: 143 MEQ/L (ref 135–145)
TOTAL PROTEIN: 7 G/DL (ref 6.1–8)
WBC # BLD: 6.9 THOU/MM3 (ref 4.8–10.8)

## 2019-12-27 PROCEDURE — 82248 BILIRUBIN DIRECT: CPT

## 2019-12-27 PROCEDURE — 36415 COLL VENOUS BLD VENIPUNCTURE: CPT

## 2019-12-27 PROCEDURE — 74177 CT ABD & PELVIS W/CONTRAST: CPT

## 2019-12-27 PROCEDURE — 80053 COMPREHEN METABOLIC PANEL: CPT

## 2019-12-27 PROCEDURE — 85027 COMPLETE CBC AUTOMATED: CPT

## 2019-12-27 PROCEDURE — 83690 ASSAY OF LIPASE: CPT

## 2019-12-27 PROCEDURE — 6360000004 HC RX CONTRAST MEDICATION: Performed by: NURSE PRACTITIONER

## 2019-12-27 PROCEDURE — 82150 ASSAY OF AMYLASE: CPT

## 2019-12-27 RX ADMIN — IOHEXOL 50 ML: 240 INJECTION, SOLUTION INTRATHECAL; INTRAVASCULAR; INTRAVENOUS; ORAL at 08:28

## 2019-12-27 RX ADMIN — IOPAMIDOL 85 ML: 755 INJECTION, SOLUTION INTRAVENOUS at 08:28

## 2020-01-15 ENCOUNTER — TELEPHONE (OUTPATIENT)
Dept: FAMILY MEDICINE CLINIC | Age: 70
End: 2020-01-15

## 2020-02-04 ENCOUNTER — OFFICE VISIT (OUTPATIENT)
Dept: SURGERY | Age: 70
End: 2020-02-04
Payer: MEDICARE

## 2020-02-04 VITALS
WEIGHT: 169.5 LBS | SYSTOLIC BLOOD PRESSURE: 116 MMHG | TEMPERATURE: 97.4 F | DIASTOLIC BLOOD PRESSURE: 64 MMHG | HEIGHT: 63 IN | HEART RATE: 64 BPM | RESPIRATION RATE: 18 BRPM | OXYGEN SATURATION: 97 % | BODY MASS INDEX: 30.03 KG/M2

## 2020-02-04 PROCEDURE — 99214 OFFICE O/P EST MOD 30 MIN: CPT | Performed by: SURGERY

## 2020-02-04 RX ORDER — ASPIRIN 325 MG
325 TABLET ORAL DAILY
COMMUNITY
End: 2022-10-31

## 2020-02-23 ASSESSMENT — ENCOUNTER SYMPTOMS
TROUBLE SWALLOWING: 0
PHOTOPHOBIA: 0
SINUS PAIN: 0
STRIDOR: 0
FACIAL SWELLING: 0
CHEST TIGHTNESS: 1
SHORTNESS OF BREATH: 0
RHINORRHEA: 0
ABDOMINAL PAIN: 1
APNEA: 0
BACK PAIN: 0
COUGH: 0
EYE REDNESS: 0
EYE DISCHARGE: 0
VOICE CHANGE: 0
COLOR CHANGE: 0
ALLERGIC/IMMUNOLOGIC NEGATIVE: 1
EYE PAIN: 0
SINUS PRESSURE: 0
EYE ITCHING: 0
CHOKING: 0
SORE THROAT: 0
EYES NEGATIVE: 1
WHEEZING: 0

## 2020-02-23 NOTE — PROGRESS NOTES
Bonnie[address] [place] [address]. [address]  [facility] [address]  Dept: [phone]  Dept Fax: [phone]  Loc: [phone]    Visit Date: 2/4/2020    Nikita Evans is a 72 y.o. female who presentstoday for:  Chief Complaint   Patient presents with    Surgical Consult     est pt- last seen- 11/2012- gi issues-- pain/ burning in chest and abdomen- EGD/ colonscopy done Dr. Damon Berg- 10/19-        HPI:     HPI 72-year-old white female well-known to me who I had removed her gallbladder several years ago had a several month history of epigastric and burning in the chest she does have a history of coronary artery disease and had a CT scan in December 2019 showing no acute abnormalities although she does have diverticulosis she subsequently underwent an EGD and colonoscopy per Dr. Damon Berg but the symptoms are persisting and she came here for a second opinion does have a history of reflux disease takes Pepcid and Carafate on a regular basis however it is not relieving her pain she is also had a hysterectomy    Past Medical History:   Diagnosis Date    Arthritis     Chronic back pain     Closed nondisplaced intertrochanteric fracture of left femur ([address] 8/6/2016    Coronary artery disease involving native coronary artery of native heart 08/06/2016    Dr Travis Bryan cranial nerve palsy     GERD (gastroesophageal reflux disease)     Hearing loss     History of esophageal dilatation     Hyperlipidemia     Hypertension     Left foot drop     s/p lumbar spine surgery in 2011    Migraines     atypical had spasm that closed blood supply to nerve in brain    Numbness     Decreased feeling right arm left leg    PONV (postoperative nausea and vomiting)     Septal defect, heart 2016    Stroke due to occlusion of right cerebellar artery ([address] 8/7/2016    Stroke-like episode ([address]     Secondary to migraine      Past Surgical History:   Procedure Laterality Date    BACK SURGERY  2011    Lumbar fusion [place], [facility] left foot drop, chronic    CARDIAC CATHETERIZATION  2015    CARDIAC CATHETERIZATION  12/22/2016    heart repair Patent Foramen [place] Closure[facility]    CARDIOVASCULAR STRESS TEST  2011    Internal Medicine    CHOLECYSTECTOMY  over 5 years    Dr. Viviana Robertson  last one over 5 years, 2015,2019    Dr. Oscar Joshua, 2015 Dr Griffin Still EGD  2019    HIP SURGERY Left 08/06/2016    Dr. Sherren Brands pinbobby    HYSTERECTOMY      LEG SURGERY      femur repair    MYRINGOTOMY AND TYMPANOSTOMY TUBE PLACEMENT  07/18/2014    Dr. Kelechi Doss  11/21/2016    CYSTOSCOPY, URETEROGRAM , PYELOGRAM    TUMOR EXCISION  10/2017    back of neck -[facility]    UPPER GASTROINTESTINAL ENDOSCOPY  2015    With dilation    WRIST GANGLION EXCISION Right         Family History   Problem Relation Age of Onset    Alzheimer's Disease Mother     High Blood Pressure Mother     Diabetes Mother     Heart Disease Mother     Heart Disease Father     Cancer Father 78         x 2-skin    Colon Cancer Father 79    Lung Cancer Father 80    Diabetes Father     Diabetes Sister     Diabetes Brother     Colon Polyps Brother     Cancer Paternal Grandmother         bladder    Stroke Paternal Grandfather         Social History     Tobacco Use    Smoking status: Former Smoker     Packs/day: 0.75     Years: 45.00     Pack years: 33.75     Types: Cigarettes     Last attempt to quit: 8/8/2016     Years since quitting: 3.5    Smokeless tobacco: Never Used   Substance Use Topics    Alcohol use: Yes     Comment: very rarely          Current Outpatient Medications   Medication Sig Dispense Refill    aspirin 325 MG tablet Take 325 mg by mouth daily      famotidine (PEPCID) 20 MG tablet Take 1 tablet by mouth 2 times daily (before meals) 180 tablet 1    spironolactone (ALDACTONE) 25 MG tablet TAKE 1 TABLET BY MOUTH EVERY DAY 90 tablet 3    hydrocortisone dyspareunia, dysuria, enuresis, flank pain, frequency, genital sores, hematuria, menstrual problem, pelvic pain, urgency, vaginal bleeding, vaginal discharge and vaginal pain. Musculoskeletal: Negative. Negative for arthralgias, back pain, gait problem, joint swelling, myalgias, neck pain and neck stiffness. Skin: Negative for color change, pallor, rash and wound. Allergic/Immunologic: Negative. Negative for environmental allergies, food allergies and immunocompromised state. Neurological: Positive for weakness. Negative for dizziness, tremors, seizures, syncope, facial asymmetry, speech difficulty, light-headedness, numbness and headaches. Hematological: Negative for adenopathy. Does not bruise/bleed easily. Psychiatric/Behavioral: Positive for decreased concentration and dysphoric mood. Negative for agitation, behavioral problems, confusion, hallucinations, self-injury, sleep disturbance and suicidal ideas. The patient is not nervous/anxious and is not hyperactive. Objective:   /64 (Site: Left Upper Arm, Position: Sitting, Cuff Size: Medium Adult)   Pulse 64   Temp 97.4 °F (36.3 °C) (Oral)   Resp 18   Ht 5' 3\" (1.6 m)   Wt 169 lb 8 oz (76.9 kg)   LMP 11/06/1982   SpO2 97%   BMI 30.03 kg/m²     Physical Exam  Constitutional:       Appearance: She is well-developed. HENT:      Head: Normocephalic and atraumatic. Eyes:      General: No scleral icterus. Right eye: No discharge. Left eye: No discharge. Conjunctiva/sclera: Conjunctivae normal.      Pupils: Pupils are equal, round, and reactive to light. Neck:      Musculoskeletal: Normal range of motion and neck supple. Thyroid: No thyromegaly. Vascular: No JVD. Cardiovascular:      Rate and Rhythm: Normal rate and regular rhythm. Heart sounds: No murmur. No friction rub. No gallop. Pulmonary:      Effort: Pulmonary effort is normal. No respiratory distress.       Breath sounds: Normal breath sounds. No stridor. No wheezing. Chest:      Chest wall: No tenderness. Abdominal:      Palpations: There is no mass. Tenderness: There is abdominal tenderness. There is no guarding or rebound. Hernia: No hernia is present. Musculoskeletal: Normal range of motion. Skin:     General: Skin is warm and dry. Coloration: Skin is not pale. Findings: No erythema or rash. Neurological:      Mental Status: She is alert and oriented to person, place, and time. Psychiatric:         Behavior: Behavior normal.         Thought Content:  Thought content normal.         Judgment: Judgment normal.            Results for orders placed or performed during the hospital encounter of 12/27/19   Amylase   Result Value Ref Range    Amylase 33 20 - 104 U/L   Basic Metabolic Panel   Result Value Ref Range    Sodium 143 135 - 145 meq/L    Potassium 4.0 3.5 - 5.2 meq/L    Chloride 103 98 - 111 meq/L    CO2 25 23 - 33 meq/L    Glucose 89 70 - 108 mg/dL    BUN 12 7 - 22 mg/dL    CREATININE 0.7 0.4 - 1.2 mg/dL    Calcium 9.6 8.5 - 10.5 mg/dL   CBC   Result Value Ref Range    WBC 6.9 4.8 - 10.8 thou/mm3    RBC 4.46 4.20 - 5.40 mill/mm3    Hemoglobin 14.8 12.0 - 16.0 gm/dl    Hematocrit 45.7 37.0 - 47.0 %    .5 (H) 81.0 - 99.0 fL    MCH 33.2 (H) 26.0 - 33.0 pg    MCHC 32.4 32.2 - 35.5 gm/dl    RDW-CV 14.0 11.5 - 14.5 %    RDW-SD 53.2 (H) 35.0 - 45.0 fL    Platelets 136 974 - 431 thou/mm3    MPV 10.4 9.4 - 12.4 fL   Hepatic Function Panel   Result Value Ref Range    Alb 4.2 3.5 - 5.1 g/dL    Total Bilirubin 0.4 0.3 - 1.2 mg/dL    Bilirubin, Direct <0.2 0.0 - 0.3 mg/dL    Alkaline Phosphatase 77 38 - 126 U/L    AST 24 5 - 40 U/L    ALT 15 11 - 66 U/L    Total Protein 7.0 6.1 - 8.0 g/dL   Lipase   Result Value Ref Range    Lipase 12.9 5.6 - 51.3 U/L   Anion Gap   Result Value Ref Range    Anion Gap 15.0 8.0 - 16.0 meq/L   Glomerular Filtration Rate, Estimated   Result Value Ref Range    Est, Glom Filt Rate

## 2020-04-14 ENCOUNTER — TELEPHONE (OUTPATIENT)
Dept: UROLOGY | Age: 70
End: 2020-04-14

## 2020-04-15 ENCOUNTER — TELEPHONE (OUTPATIENT)
Dept: UROLOGY | Age: 70
End: 2020-04-15

## 2020-04-15 RX ORDER — SULFAMETHOXAZOLE AND TRIMETHOPRIM 800; 160 MG/1; MG/1
1 TABLET ORAL 2 TIMES DAILY
Qty: 20 TABLET | Refills: 0 | Status: SHIPPED | OUTPATIENT
Start: 2020-04-15 | End: 2020-04-20

## 2020-04-17 LAB
APPEARANCE: ABNORMAL
BILIRUBIN: NEGATIVE
COLOR: YELLOW
GLUCOSE BLD-MCNC: NEGATIVE MG/DL
KETONES, URINE: NEGATIVE MG/DL
LEUKOCYTE ESTERASE, URINE: ABNORMAL
NITRITE, URINE: POSITIVE
OCCULT BLOOD,URINE: NEGATIVE
OTHER MICROSCOPIC ELEMENTS: ABNORMAL
PH: 6 (ref 5–8.5)
PROTEIN, URINE: NEGATIVE MG/DL
RBC: 1 /HPF (ref 0–5)
REPORT STATUS: NORMAL
SITE/TYPE: ABNORMAL
SITE/TYPE: NORMAL
SP GRAVITY MISCELLANEOUS: 1.01 (ref 1–1.03)
URINE CULTURE, ROUTINE: NORMAL
UROBILINOGEN, URINE: <1.1 EU/DL
WBC: 201 /HPF (ref 0–5)

## 2020-11-09 RX ORDER — SPIRONOLACTONE 25 MG/1
TABLET ORAL
Qty: 30 TABLET | Refills: 0 | Status: SHIPPED | OUTPATIENT
Start: 2020-11-09

## 2021-10-12 ENCOUNTER — HOSPITAL ENCOUNTER (OUTPATIENT)
Age: 71
Discharge: HOME | End: 2021-10-12
Payer: MEDICARE

## 2021-10-12 ENCOUNTER — HOSPITAL ENCOUNTER (OUTPATIENT)
Dept: GENERAL RADIOLOGY | Age: 71
Discharge: HOME | End: 2021-10-12
Payer: MEDICARE

## 2021-10-12 DIAGNOSIS — R05.9 COUGH: ICD-10-CM

## 2021-10-12 DIAGNOSIS — R06.02 SOB (SHORTNESS OF BREATH): ICD-10-CM

## 2021-10-12 PROCEDURE — 71046 X-RAY EXAM CHEST 2 VIEWS: CPT

## 2022-05-03 RX ORDER — PITAVASTATIN CALCIUM 4.18 MG/1
TABLET, FILM COATED ORAL
COMMUNITY
Start: 2022-01-24 | End: 2022-10-26

## 2022-05-03 RX ORDER — UBIDECARENONE 200 MG
200 CAPSULE ORAL DAILY
COMMUNITY
End: 2022-10-31

## 2022-05-03 RX ORDER — ASPIRIN 81 MG/1
1 TABLET ORAL DAILY
COMMUNITY

## 2022-07-20 ENCOUNTER — HOSPITAL ENCOUNTER (EMERGENCY)
Age: 72
Discharge: HOME | End: 2022-07-20
Payer: MEDICARE

## 2022-07-20 VITALS
RESPIRATION RATE: 16 BRPM | HEART RATE: 79 BPM | OXYGEN SATURATION: 95 % | TEMPERATURE: 97 F | DIASTOLIC BLOOD PRESSURE: 70 MMHG | SYSTOLIC BLOOD PRESSURE: 125 MMHG

## 2022-07-20 DIAGNOSIS — J06.9 URI WITH COUGH AND CONGESTION: Primary | ICD-10-CM

## 2022-07-20 DIAGNOSIS — K21.9 GASTROESOPHAGEAL REFLUX DISEASE, UNSPECIFIED WHETHER ESOPHAGITIS PRESENT: ICD-10-CM

## 2022-07-20 PROCEDURE — 99213 OFFICE O/P EST LOW 20 MIN: CPT | Performed by: NURSE PRACTITIONER

## 2022-07-20 PROCEDURE — 99213 OFFICE O/P EST LOW 20 MIN: CPT

## 2022-07-20 RX ORDER — SUCRALFATE 1 G/1
1 TABLET ORAL 4 TIMES DAILY
Qty: 120 TABLET | Refills: 0 | Status: SHIPPED | OUTPATIENT
Start: 2022-07-20 | End: 2022-10-31

## 2022-07-20 RX ORDER — BROMPHENIRAMINE MALEATE, PSEUDOEPHEDRINE HYDROCHLORIDE, AND DEXTROMETHORPHAN HYDROBROMIDE 2; 30; 10 MG/5ML; MG/5ML; MG/5ML
5 SYRUP ORAL 4 TIMES DAILY PRN
Qty: 118 ML | Refills: 0 | Status: SHIPPED | OUTPATIENT
Start: 2022-07-20 | End: 2022-10-31

## 2022-07-20 ASSESSMENT — ENCOUNTER SYMPTOMS
SHORTNESS OF BREATH: 0
VOMITING: 0
SORE THROAT: 1
CHEST TIGHTNESS: 0
COUGH: 1
NAUSEA: 0
ABDOMINAL PAIN: 0

## 2022-07-20 ASSESSMENT — PAIN - FUNCTIONAL ASSESSMENT: PAIN_FUNCTIONAL_ASSESSMENT: NONE - DENIES PAIN

## 2022-07-20 NOTE — ED TRIAGE NOTES
Had covid 3 weeks ago and has had sinus congestion and burning throat and chest which is relieved with peptobismal/antacids, but won't go away

## 2022-07-20 NOTE — ED PROVIDER NOTES
[facility]  Urgent Care Encounter       CHIEF COMPLAINT       Chief Complaint   Patient presents with    Sinus Problem       Nurses Notes reviewed and I agree except as noted in the HPI. HISTORY OF PRESENT ILLNESS   Tania Oscar is a 71 y.o. female who presents with complaints of sinus congestion, bilateral ear fullness, throat burning, chest burning, and cough. She also complains of a hoarse voice. She states this is a new problem that started 3 weeks ago. She does admit to COVID-19 on 7/3/2022. Her symptoms have been persistent since that time. However, she states her chest burning and tightness has slightly worsened. She has taken Pepto-Bismol and antacids for relief which helps for about 10 minutes. However, she states her symptoms quickly returned after taking the medications. She denies any history of GERD or following with GI. She does admit to intermittent smoking. The history is provided by the patient. REVIEW OF SYSTEMS     Review of Systems   Constitutional:  Negative for activity change and fever. HENT:  Positive for congestion, ear pain (fullness) and sore throat. Negative for postnasal drip. Respiratory:  Positive for cough. Negative for chest tightness and shortness of breath. Cardiovascular:  Negative for chest pain. Gastrointestinal:  Negative for abdominal pain, nausea and vomiting. Musculoskeletal:  Negative for myalgias. Neurological:  Negative for dizziness and headaches.      PAST MEDICAL HISTORY         Diagnosis Date    Arthritis     Atypical chest pain     Chronic back pain     Closed nondisplaced intertrochanteric fracture of left femur ([facility] [address] 8/6/2016    Coronary artery disease involving native coronary artery of native heart 08/06/2016    Dr Gould Small cranial nerve palsy     GERD (gastroesophageal reflux disease)     Hyperlipidemia     Hypertension     Left foot drop     s/p lumbar spine surgery in 2011    Migraines spironolactone (ALDACTONE) 25 MG tablet TAKE 1 TABLET BY MOUTH EVERY DAY, Disp-30 tablet,R-0Normal      senna (SENOKOT) 8.6 MG tablet Take 1 tablet by mouth 2 times dailyHistorical Med      Multiple Vitamins-Minerals (MULTIVITAMIN ADULTS PO) Take 1 tablet by mouth dailyHistorical Med      vitamin C (ASCORBIC ACID) 500 MG tablet Take 500 mg by mouth dailyHistorical Med      Probiotic Product (PROBIOTIC ADVANCED PO) Take 1 tablet by mouth dailyHistorical Med      !! pitavastatin (LIVALO) 2 MG TABS tablet Take 2 mg by mouth nightlyHistorical Med      ALPRAZolam (XANAX) 0.25 MG tablet Take 0.25 mg by mouth nightly as needed for Sleep. Historical Med      famotidine (PEPCID) 20 MG tablet Take 1 tablet by mouth 2 times daily (before meals), Disp-180 tablet, R-1Normal      aspirin 325 MG tablet Take 325 mg by mouth dailyHistorical Med      folic acid (FOLVITE) 1 MG tablet Take 1 tablet by mouth daily, Disp-30 tablet, R-3      nitroGLYCERIN (NITROSTAT) 0.4 MG SL tablet Place 0.4 mg under the tongue every 5 minutes as needed for Chest pain. !! - Potential duplicate medications found. Please discuss with provider. ALLERGIES     Patient is is allergic to bupropion and zofran [ondansetron hcl]. Patients   Immunization History   Administered Date(s) Administered    Pneumococcal Conjugate 13-valent [place]) 06/13/2016    Zoster Live (Zostavax) 06/13/2016       FAMILY HISTORY     Patient's family history includes Alzheimer's Disease in her mother; Cancer in her paternal grandmother; Cancer (age of onset: 78) in her father; Colon Cancer (age of onset: 79) in her father; Colon Polyps in her brother; Diabetes in her brother, father, mother, and sister; Heart Disease in her father and mother; High Blood Pressure in her mother; Lung Cancer (age of onset: 80) in her father; Stroke in her paternal grandfather. SOCIAL HISTORY     Patient  reports that she quit smoking about 5 years ago.  Her smoking use included cigarettes. She has a 33.75 pack-year smoking history. She has never used smokeless tobacco. She reports current alcohol use. She reports that she does not use drugs. PHYSICAL EXAM     ED TRIAGE VITALS  BP: 125/70, Temp: 97 °F (36.1 °C), Heart Rate: 79, Resp: 16, SpO2: 95 %,Estimated body mass index is 29.7 kg/m² as calculated from the following:    Height as of 1/10/22: 5' 4\" (1.626 m). Weight as of 1/10/22: 173 lb (78.5 kg). ,Patient's last menstrual period was 11/06/1982. Physical Exam  Vitals and nursing note reviewed. Constitutional:       General: She is not in acute distress. HENT:      Right Ear: Tympanic membrane normal.      Left Ear: Tympanic membrane normal.      Nose: Congestion present. No rhinorrhea. Mouth/Throat:      Mouth: Mucous membranes are moist.      Pharynx: Posterior oropharyngeal erythema present. No oropharyngeal exudate. Cardiovascular:      Rate and Rhythm: Normal rate and regular rhythm. Heart sounds: Normal heart sounds. Pulmonary:      Effort: Pulmonary effort is normal.      Breath sounds: Normal breath sounds. Abdominal:      General: Abdomen is flat. Bowel sounds are normal.      Tenderness: There is no abdominal tenderness. Lymphadenopathy:      Cervical: No cervical adenopathy. Skin:     General: Skin is warm and dry. Neurological:      Mental Status: She is alert and oriented to person, place, and time. DIAGNOSTIC RESULTS     Labs:No results found for this visit on 07/20/22. IMAGING:  None    EKG:  None    URGENT CARE COURSE:     Vitals:    07/20/22 1658   BP: 125/70   Pulse: 79   Resp: 16   Temp: 97 °F (36.1 °C)   TempSrc: Infrared   SpO2: 95%       Medications - No data to display       PROCEDURES:  None    FINAL IMPRESSION      1. URI with cough and congestion    2.  Gastroesophageal reflux disease, unspecified whether esophagitis present      DISPOSITION/ PLAN   DISPOSITION Decision To Discharge 07/20/2022 05:31:23 PM     Patient presents with persistent congestion, cough, and symptoms of upper respiratory infection. This is likely post COVID symptoms. Recommend patient start oral decongestant and cough suppressant as needed. Okay for over-the-counter Tylenol as well. Encourage oral fluid intake. In addition, I did review the patient's past medical history which included a large history of gastritis and reflux disease. Recommended patient start Nexium and Carafate as previously prescribed. Patient to follow-up with GI and PCP for reevaluation and improvement in symptoms. Patient voiced understanding was agreeable to above-mentioned plan. Patient was discharged in stable condition. PATIENT REFERRED TO:  Triny Salguero MD  [address] [facility] [address]      DISCHARGE MEDICATIONS:  Discharge Medication List as of 7/20/2022  5:35 PM          Discharge Medication List as of 7/20/2022  5:35 PM          Discharge Medication List as of 7/20/2022  5:35 PM        CONTINUE these medications which have CHANGED    Details   esomeprazole (NEXIUM) 20 MG delayed release capsule Take 1 capsule by mouth in the morning and 1 capsule before bedtime. , Disp-60 capsule, R-1Normal      sucralfate (CARAFATE) 1 GM tablet Take 1 tablet by mouth in the morning and 1 tablet at noon and 1 tablet in the evening and 1 tablet before bedtime. , Disp-120 tablet, R-0Normal             PAULINO Dawson CNP    (Please note that portions of this note were completed with a voice recognition program. Efforts were made to edit the dictations but occasionally words are mis-transcribed.)           PAULINO Dawson CNP  07/20/22 [id number]

## 2022-10-26 RX ORDER — PITAVASTATIN CALCIUM 4.18 MG/1
TABLET, FILM COATED ORAL
Qty: 60 TABLET | Refills: 0 | Status: SHIPPED | OUTPATIENT
Start: 2022-10-26 | End: 2022-11-02 | Stop reason: SDUPTHER

## 2022-11-02 ENCOUNTER — TELEPHONE (OUTPATIENT)
Dept: CARDIOLOGY CLINIC | Age: 72
End: 2022-11-02

## 2022-11-02 ENCOUNTER — OFFICE VISIT (OUTPATIENT)
Dept: CARDIOLOGY CLINIC | Age: 72
End: 2022-11-02
Payer: MEDICARE

## 2022-11-02 VITALS
SYSTOLIC BLOOD PRESSURE: 136 MMHG | BODY MASS INDEX: 28.61 KG/M2 | HEART RATE: 64 BPM | WEIGHT: 156.4 LBS | DIASTOLIC BLOOD PRESSURE: 64 MMHG

## 2022-11-02 DIAGNOSIS — I10 HYPERTENSION, UNSPECIFIED TYPE: Primary | ICD-10-CM

## 2022-11-02 DIAGNOSIS — E78.5 DYSLIPIDEMIA (HIGH LDL; LOW HDL): ICD-10-CM

## 2022-11-02 PROCEDURE — 93000 ELECTROCARDIOGRAM COMPLETE: CPT | Performed by: INTERNAL MEDICINE

## 2022-11-02 PROCEDURE — 1123F ACP DISCUSS/DSCN MKR DOCD: CPT | Performed by: INTERNAL MEDICINE

## 2022-11-02 PROCEDURE — 99213 OFFICE O/P EST LOW 20 MIN: CPT | Performed by: INTERNAL MEDICINE

## 2022-11-02 PROCEDURE — 3074F SYST BP LT 130 MM HG: CPT | Performed by: INTERNAL MEDICINE

## 2022-11-02 PROCEDURE — 3078F DIAST BP <80 MM HG: CPT | Performed by: INTERNAL MEDICINE

## 2022-11-02 RX ORDER — PITAVASTATIN CALCIUM 4.18 MG/1
TABLET, FILM COATED ORAL
Qty: 60 TABLET | Refills: 5 | Status: SHIPPED | OUTPATIENT
Start: 2022-11-02

## 2022-11-02 ASSESSMENT — ENCOUNTER SYMPTOMS
BLOOD IN STOOL: 0
ABDOMINAL PAIN: 0
STRIDOR: 0
VOICE CHANGE: 0
COLOR CHANGE: 0
SHORTNESS OF BREATH: 0
ANAL BLEEDING: 0
VOMITING: 0
CHEST TIGHTNESS: 0
WHEEZING: 0
ABDOMINAL DISTENTION: 0
TROUBLE SWALLOWING: 0
CHOKING: 0
NAUSEA: 0
APNEA: 0
COUGH: 0

## 2022-11-02 NOTE — PROGRESS NOTES
Past Medical History:   Diagnosis Date    Arthritis     Atypical chest pain     Chronic back pain     Closed nondisplaced intertrochanteric fracture of left femur ([address] 08/06/2016    Coronary artery disease involving native coronary artery of native heart 08/06/2016    Dr Jassi Olguin 2022    Fifth cranial nerve palsy     GERD (gastroesophageal reflux disease)     Hyperlipidemia     Hypertension     Left foot drop     s/p lumbar spine surgery in 2011    Migraines     atypical had spasm that closed blood supply to nerve in brain    Numbness     Decreased feeling right arm left leg    PONV (postoperative nausea and vomiting)     Septal defect, heart 2016    SOB (shortness of breath)     Stroke due to occlusion of right cerebellar artery ([facility]) 08/07/2016    Stroke-like episode     Secondary to migraine       Allergies   Allergen Reactions    Bupropion Other (See Comments)     irritable    Zofran [Ondansetron Hcl] Nausea And Vomiting       Current Outpatient Medications   Medication Sig Dispense Refill    pitavastatin (LIVALO) 4 MG TABS tablet TAKE 1 TABLET BY MOUTH EVERY DAY 60 tablet 5    esomeprazole (NEXIUM) 20 MG delayed release capsule Take 1 capsule by mouth in the morning and 1 capsule before bedtime.  60 capsule 1    aspirin 81 MG EC tablet Take 1 tablet by mouth daily      vitamin D3 (CHOLECALCIFEROL) 10 MCG (400 UNIT) TABS tablet Take 400 Units by mouth daily      vitamin E 400 UNIT capsule Take 400 Units by mouth daily      methocarbamol (ROBAXIN) 750 MG tablet Take 750 mg by mouth 3 times daily      Omega-3 Fatty Acids (FISH OIL) 1000 MG CAPS Take 1,000 mg by mouth daily      Coenzyme Q10 (COQ-10) 100 MG CAPS Take 1 capsule by mouth daily      Zinc 25 MG TABS Take 1 tablet by mouth daily      ELDERBERRY PO Take 1 capsule by mouth daily      LINZESS 145 MCG capsule TAKE 1 CAPSULE BY MOUTH EVERY DAY 90 capsule 3    spironolactone (ALDACTONE) 25 MG tablet TAKE 1 TABLET BY MOUTH EVERY DAY 30 tablet 0 senna (SENOKOT) 8.6 MG tablet Take 1 tablet by mouth 2 times daily      Multiple Vitamins-Minerals (MULTIVITAMIN ADULTS PO) Take 1 tablet by mouth daily      vitamin C (ASCORBIC ACID) 500 MG tablet Take 500 mg by mouth daily      Probiotic Product (PROBIOTIC ADVANCED PO) Take 1 tablet by mouth daily      ALPRAZolam (XANAX) 0.25 MG tablet Take 0.25 mg by mouth nightly as needed for Sleep.      folic acid (FOLVITE) 1 MG tablet Take 1 tablet by mouth daily 30 tablet 3    nitroGLYCERIN (NITROSTAT) 0.4 MG SL tablet Place 0.4 mg under the tongue every 5 minutes as needed for Chest pain. sucralfate (CARAFATE) 1 GM tablet Take 1 tablet by mouth in the morning and 1 tablet at noon and 1 tablet in the evening and 1 tablet before bedtime. (Patient taking differently: Take 1 g by mouth 3 times daily as needed) 120 tablet 0    famotidine (PEPCID) 20 MG tablet Take 1 tablet by mouth 2 times daily (before meals) 180 tablet 1     No current facility-administered medications for this visit.        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     Employer: OTHER   Tobacco Use    Smoking status: Former     Packs/day: 0.75     Years: 45.00     Pack years: 33.75     Types: Cigarettes     Quit date: 2016     Years since quittin.2    Smokeless tobacco: Never   Vaping Use    Vaping Use: Never used   Substance and Sexual Activity    Alcohol use: Yes     Comment: very rarely    Drug use: No    Sexual activity: Yes     Partners: Male       Family History   Problem Relation Age of Onset    Alzheimer's Disease Mother     High Blood Pressure Mother     Diabetes Mother     Heart Disease Mother     Heart Disease Father     Cancer Father 78         x 2-skin    Colon Cancer Father 79    Lung Cancer Father 80    Diabetes Father     Diabetes Sister     Diabetes Brother     Colon Polyps Brother     Cancer Paternal Grandmother         bladder    Stroke Paternal Grandfather        Blood pressure 136/64, pulse 64, weight 156 lb 6.4 oz (70.9 kg), last menstrual period 11/06/1982, not currently breastfeeding. Physical Exam:    General Appearance: alert and oriented to person, place and time, in no acute distress  Cardiovascular: normal rate, regular rhythm, normal S1 and S2, no murmurs, rubs, clicks, or gallops, distal pulses intact, no carotid bruits, no JVD  Pulmonary/Chest: clear to auscultation bilaterally- no wheezes, rales or rhonchi, normal air movement, no respiratory distress  Abdomen: soft, non-tender, non-distended, normal bowel sounds, no masses   Extremities: no cyanosis, clubbing or edema, pulse   Skin: warm and dry, no rash or erythema  Head: normocephalic and atraumatic  Eyes: pupils equal, round, and reactive to light  Neck: supple and non-tender without mass, no thyromegaly   Musculoskeletal: normal range of motion, no joint swelling, deformity or tenderness  Neurological: alert, oriented, normal speech, no focal findings or movement disorder noted    Lab Data:    Cardiac Enzymes:  No results for input(s): CKTOTAL, CKMB, CKMBINDEX, TROPONINI in the last 72 hours.     CBC:   Lab Results   Component Value Date/Time     04/16/2020 06:00 AM    WBC 6.9 12/27/2019 07:18 AM    RBC 1 04/16/2020 06:00 AM    HGB 14.8 12/27/2019 07:18 AM    HCT 45.7 12/27/2019 07:18 AM     12/27/2019 07:18 AM       CMP:    Lab Results   Component Value Date/Time     12/27/2019 07:18 AM    K 4.0 12/27/2019 07:18 AM    K 3.9 05/15/2019 05:10 PM     12/27/2019 07:18 AM    CO2 25 12/27/2019 07:18 AM    BUN 12 12/27/2019 07:18 AM    CREATININE 0.7 12/27/2019 07:18 AM    LABGLOM 83 12/27/2019 07:18 AM    GLUCOSE Negative 04/16/2020 06:00 AM    GLUCOSE 89 08/04/2011 12:30 PM    CALCIUM 9.6 12/27/2019 07:18 AM       Hepatic Function Panel:    Lab Results   Component Value Date/Time    ALKPHOS 77 12/27/2019 07:18 AM    ALT 15 12/27/2019 07:18 AM    AST 24 12/27/2019 07:18 AM    PROT 7.0 12/27/2019 07:18 AM    BILITOT Negative 04/16/2020 06:00 AM    BILIDIR <0.2 12/27/2019 07:18 AM    LABALBU 4.2 12/27/2019 07:18 AM       Magnesium:    Lab Results   Component Value Date/Time    MG 1.7 08/10/2016 04:30 AM       PT/INR:    Lab Results   Component Value Date/Time    PROTIME 0.98 08/04/2011 12:30 PM    INR 0.96 03/08/2013 08:27 AM       HgBA1c:    Lab Results   Component Value Date/Time    LABA1C 5.5 08/10/2016 04:30 AM       FLP:    Lab Results   Component Value Date/Time    TRIG 164 08/08/2016 04:00 AM    HDL 33 08/08/2016 04:00 AM    LDLCALC 113 08/08/2016 04:00 AM       TSH:    Lab Results   Component Value Date/Time    TSH 1.980 01/27/2015 12:40 PM        Diagnosis Orders   1. Hypertension, unspecified type  EKG 12 lead    CBC    Basic Metabolic Panel    Lipid Panel    Hepatic Function Panel      2. Dyslipidemia (high LDL; low HDL)  CBC    Basic Metabolic Panel    Lipid Panel    Hepatic Function Panel           Assessment/Plan    71years old lady history of atrial septal defect and repair 5 years ago the patient had history of borderline hypertension and history of gastroesophageal reflux history of hypercholesterolemia she has been on the [place] the patient denied chest pain denied palpitation and she has been physically active. She was offered to go to her family physician this point she does not have family physician she was advised to find 1 will be see her next year with the lab work patient is seeing Dr. Nyla Douglas for esophageal dilatation.     Cardiac wise she is stable continue current medication seen annually seek medical attention if she has any change in clinical condition thank    Orders Placed This Encounter   Procedures    CBC     Standing Status:   Future     Standing Expiration Date:   40/0/7413    Basic Metabolic Panel     Standing Status: Future     Standing Expiration Date:   11/2/2023    Lipid Panel     Standing Status:   Future     Standing Expiration Date:   11/2/2023     Order Specific Question:   Is Patient Fasting?/# of Hours     Answer:   12 hours    Hepatic Function Panel     Standing Status:   Future     Standing Expiration Date:   11/2/2023    EKG 12 lead     Order Specific Question:   Reason for Exam?     Answer:   Hypertension       No follow-ups on file.      Adilia Hawk MD

## 2023-06-12 ENCOUNTER — TELEPHONE (OUTPATIENT)
Dept: CARDIOLOGY CLINIC | Age: 73
End: 2023-06-12

## 2023-06-27 ENCOUNTER — PREP FOR PROCEDURE (OUTPATIENT)
Dept: CARDIOLOGY | Age: 73
End: 2023-06-27

## 2023-06-27 RX ORDER — SODIUM CHLORIDE 0.9 % (FLUSH) 0.9 %
5-40 SYRINGE (ML) INJECTION EVERY 12 HOURS SCHEDULED
Status: CANCELLED | OUTPATIENT
Start: 2023-06-27

## 2023-06-27 RX ORDER — SODIUM CHLORIDE 9 MG/ML
INJECTION, SOLUTION INTRAVENOUS PRN
Status: CANCELLED | OUTPATIENT
Start: 2023-06-27

## 2023-06-27 RX ORDER — SODIUM CHLORIDE 0.9 % (FLUSH) 0.9 %
5-40 SYRINGE (ML) INJECTION PRN
Status: CANCELLED | OUTPATIENT
Start: 2023-06-27

## 2023-06-28 ENCOUNTER — HOSPITAL ENCOUNTER (OUTPATIENT)
Age: 73
Setting detail: OUTPATIENT SURGERY
Discharge: HOME | End: 2023-06-28
Attending: INTERNAL MEDICINE | Admitting: INTERNAL MEDICINE
Payer: MEDICARE

## 2023-06-28 VITALS
DIASTOLIC BLOOD PRESSURE: 63 MMHG | OXYGEN SATURATION: 95 % | HEIGHT: 63 IN | BODY MASS INDEX: 27.07 KG/M2 | RESPIRATION RATE: 18 BRPM | SYSTOLIC BLOOD PRESSURE: 140 MMHG | TEMPERATURE: 98 F | HEART RATE: 55 BPM | WEIGHT: 152.8 LBS

## 2023-06-28 DIAGNOSIS — Q21.12 PFO (PATENT FORAMEN OVALE): ICD-10-CM

## 2023-06-28 DIAGNOSIS — R29.90 STROKE-LIKE EPISODE: ICD-10-CM

## 2023-06-28 LAB
LV EF: 53 %
LVEF MODALITY: NORMAL

## 2023-06-28 PROCEDURE — 93312 ECHO TRANSESOPHAGEAL: CPT

## 2023-06-28 PROCEDURE — 2709999900 HC NON-CHARGEABLE SUPPLY: Performed by: INTERNAL MEDICINE

## 2023-06-28 PROCEDURE — 93320 DOPPLER ECHO COMPLETE: CPT

## 2023-06-28 PROCEDURE — 93325 DOPPLER ECHO COLOR FLOW MAPG: CPT

## 2023-06-28 PROCEDURE — 93312 ECHO TRANSESOPHAGEAL: CPT | Performed by: INTERNAL MEDICINE

## 2023-06-28 PROCEDURE — 6370000000 HC RX 637 (ALT 250 FOR IP)

## 2023-06-28 PROCEDURE — 6360000002 HC RX W HCPCS: Performed by: INTERNAL MEDICINE

## 2023-06-28 PROCEDURE — 2580000003 HC RX 258: Performed by: STUDENT IN AN ORGANIZED HEALTH CARE EDUCATION/TRAINING PROGRAM

## 2023-06-28 PROCEDURE — 7100000010 HC PHASE II RECOVERY - FIRST 15 MIN: Performed by: INTERNAL MEDICINE

## 2023-06-28 PROCEDURE — 7100000011 HC PHASE II RECOVERY - ADDTL 15 MIN: Performed by: INTERNAL MEDICINE

## 2023-06-28 RX ORDER — SODIUM CHLORIDE 0.9 % (FLUSH) 0.9 %
5-40 SYRINGE (ML) INJECTION EVERY 12 HOURS SCHEDULED
Status: DISCONTINUED | OUTPATIENT
Start: 2023-06-28 | End: 2023-06-28 | Stop reason: HOSPADM

## 2023-06-28 RX ORDER — FENTANYL CITRATE 50 UG/ML
INJECTION, SOLUTION INTRAMUSCULAR; INTRAVENOUS PRN
Status: DISCONTINUED | OUTPATIENT
Start: 2023-06-28 | End: 2023-06-28 | Stop reason: ALTCHOICE

## 2023-06-28 RX ORDER — MIDAZOLAM HYDROCHLORIDE 1 MG/ML
INJECTION INTRAMUSCULAR; INTRAVENOUS PRN
Status: DISCONTINUED | OUTPATIENT
Start: 2023-06-28 | End: 2023-06-28 | Stop reason: ALTCHOICE

## 2023-06-28 RX ORDER — SODIUM CHLORIDE 9 MG/ML
INJECTION, SOLUTION INTRAVENOUS PRN
Status: DISCONTINUED | OUTPATIENT
Start: 2023-06-28 | End: 2023-06-28 | Stop reason: HOSPADM

## 2023-06-28 RX ORDER — SODIUM CHLORIDE 0.9 % (FLUSH) 0.9 %
5-40 SYRINGE (ML) INJECTION PRN
Status: DISCONTINUED | OUTPATIENT
Start: 2023-06-28 | End: 2023-06-28 | Stop reason: HOSPADM

## 2023-06-28 RX ADMIN — SODIUM CHLORIDE: 9 INJECTION, SOLUTION INTRAVENOUS at 12:38

## 2023-06-28 ASSESSMENT — PAIN - FUNCTIONAL ASSESSMENT: PAIN_FUNCTIONAL_ASSESSMENT: NONE - DENIES PAIN

## 2023-07-03 ENCOUNTER — TELEPHONE (OUTPATIENT)
Dept: CARDIOLOGY CLINIC | Age: 73
End: 2023-07-03

## 2023-07-03 NOTE — TELEPHONE ENCOUNTER
Pt's  Kalyan Ray), On HIPAA,  left voicemail regarding cardiac MRI that needs scheduled. He is inquiring about how to get this scheduled. His call back number is [phone].

## 2023-07-03 NOTE — TELEPHONE ENCOUNTER
Spoke with Tyrese Hamilton. Tyrese Hamilton states after pt had BANDAR - Dr. Brenda Lou stated he wanted to order a cardiac MRI. Do you still want to order a cardiac MRI?

## 2023-07-06 NOTE — TELEPHONE ENCOUNTER
Cardiac Morphology scheduled on July 14th at 1pm. Patient needs to be NPO four hours prior, no caffeine drinks 12 hours prior , be an hour early, no smoking 1 hour prior. Called the patient and informed her.

## 2023-07-14 ENCOUNTER — HOSPITAL ENCOUNTER (OUTPATIENT)
Dept: CT IMAGING | Age: 73
Discharge: HOME | End: 2023-07-14
Payer: MEDICARE

## 2023-07-14 VITALS — DIASTOLIC BLOOD PRESSURE: 63 MMHG | HEART RATE: 55 BPM | RESPIRATION RATE: 20 BRPM | SYSTOLIC BLOOD PRESSURE: 128 MMHG

## 2023-07-14 DIAGNOSIS — Q21.12 PFO (PATENT FORAMEN OVALE): ICD-10-CM

## 2023-07-14 LAB — POC CREATININE WHOLE BLOOD: 0.7 MG/DL (ref 0.5–1.2)

## 2023-07-14 PROCEDURE — 6370000000 HC RX 637 (ALT 250 FOR IP): Performed by: RADIOLOGY

## 2023-07-14 PROCEDURE — 6360000004 HC RX CONTRAST MEDICATION: Performed by: INTERNAL MEDICINE

## 2023-07-14 PROCEDURE — 75572 CT HRT W/3D IMAGE: CPT

## 2023-07-14 PROCEDURE — 82565 ASSAY OF CREATININE: CPT

## 2023-07-14 RX ORDER — METOPROLOL TARTRATE 5 MG/5ML
5 INJECTION INTRAVENOUS EVERY 5 MIN PRN
OUTPATIENT
Start: 2023-07-14

## 2023-07-14 RX ORDER — NITROGLYCERIN 0.4 MG/1
0.4 TABLET SUBLINGUAL ONCE
Status: COMPLETED | OUTPATIENT
Start: 2023-07-14 | End: 2023-07-14

## 2023-07-14 RX ADMIN — IOPAMIDOL 115 ML: 755 INJECTION, SOLUTION INTRAVENOUS at 12:17

## 2023-07-14 RX ADMIN — NITROGLYCERIN 0.4 MG: 0.4 TABLET, ORALLY DISINTEGRATING SUBLINGUAL at 12:05

## 2023-09-01 ENCOUNTER — HOSPITAL ENCOUNTER (EMERGENCY)
Age: 73
Discharge: HOME | End: 2023-09-01
Payer: MEDICARE

## 2023-09-01 VITALS
WEIGHT: 150 LBS | BODY MASS INDEX: 26.57 KG/M2 | DIASTOLIC BLOOD PRESSURE: 76 MMHG | OXYGEN SATURATION: 97 % | SYSTOLIC BLOOD PRESSURE: 136 MMHG | TEMPERATURE: 98.2 F | RESPIRATION RATE: 16 BRPM | HEART RATE: 74 BPM

## 2023-09-01 DIAGNOSIS — J06.9 UPPER RESPIRATORY TRACT INFECTION, UNSPECIFIED TYPE: Primary | ICD-10-CM

## 2023-09-01 PROCEDURE — 99213 OFFICE O/P EST LOW 20 MIN: CPT | Performed by: NURSE PRACTITIONER

## 2023-09-01 PROCEDURE — 99213 OFFICE O/P EST LOW 20 MIN: CPT

## 2023-09-01 RX ORDER — LORATADINE 10 MG
1 CAPSULE ORAL EVERY 6 HOURS PRN
Qty: 60 CAPSULE | Refills: 0 | Status: SHIPPED | OUTPATIENT
Start: 2023-09-01

## 2023-09-01 RX ORDER — DOXYCYCLINE HYCLATE 100 MG
100 TABLET ORAL 2 TIMES DAILY
Qty: 14 TABLET | Refills: 0 | Status: SHIPPED | OUTPATIENT
Start: 2023-09-01 | End: 2023-09-08

## 2023-09-01 RX ORDER — CETIRIZINE HYDROCHLORIDE 10 MG/1
10 TABLET ORAL DAILY
Qty: 30 TABLET | Refills: 0 | Status: SHIPPED | OUTPATIENT
Start: 2023-09-01 | End: 2023-10-01

## 2023-09-01 RX ORDER — FLUTICASONE PROPIONATE 50 MCG
2 SPRAY, SUSPENSION (ML) NASAL DAILY
Qty: 16 G | Refills: 0 | Status: SHIPPED | OUTPATIENT
Start: 2023-09-01

## 2023-09-01 ASSESSMENT — ENCOUNTER SYMPTOMS
VOMITING: 0
SINUS PRESSURE: 1
SHORTNESS OF BREATH: 0
COUGH: 1
DIARRHEA: 0
SORE THROAT: 0
NAUSEA: 0

## 2023-09-01 ASSESSMENT — PAIN - FUNCTIONAL ASSESSMENT: PAIN_FUNCTIONAL_ASSESSMENT: NONE - DENIES PAIN

## 2023-09-01 NOTE — ED TRIAGE NOTES
Patient c/o rt. Ear fullness 3-4 days, dry congested cough, runny nose, yellow drainage. No OTC med taken.

## 2023-09-25 RX ORDER — CETIRIZINE HYDROCHLORIDE 10 MG/1
10 TABLET ORAL DAILY
Qty: 30 TABLET | Refills: 0 | OUTPATIENT
Start: 2023-09-25

## 2023-09-25 RX ORDER — FLUTICASONE PROPIONATE 50 MCG
2 SPRAY, SUSPENSION (ML) NASAL DAILY
OUTPATIENT
Start: 2023-09-25

## 2023-11-13 ENCOUNTER — HOSPITAL ENCOUNTER (EMERGENCY)
Age: 73
Discharge: HOME | End: 2023-11-13
Payer: MEDICARE

## 2023-11-13 VITALS
RESPIRATION RATE: 16 BRPM | TEMPERATURE: 98.4 F | DIASTOLIC BLOOD PRESSURE: 82 MMHG | SYSTOLIC BLOOD PRESSURE: 134 MMHG | HEART RATE: 70 BPM | OXYGEN SATURATION: 94 %

## 2023-11-13 DIAGNOSIS — J32.9 SINOBRONCHITIS: Primary | ICD-10-CM

## 2023-11-13 DIAGNOSIS — J40 SINOBRONCHITIS: Primary | ICD-10-CM

## 2023-11-13 PROCEDURE — 99213 OFFICE O/P EST LOW 20 MIN: CPT | Performed by: EMERGENCY MEDICINE

## 2023-11-13 PROCEDURE — 99213 OFFICE O/P EST LOW 20 MIN: CPT

## 2023-11-13 RX ORDER — AMOXICILLIN AND CLAVULANATE POTASSIUM 875; 125 MG/1; MG/1
1 TABLET, FILM COATED ORAL 2 TIMES DAILY
Qty: 14 TABLET | Refills: 0 | Status: SHIPPED | OUTPATIENT
Start: 2023-11-13 | End: 2023-11-20

## 2023-11-13 RX ORDER — PREDNISONE 20 MG/1
20 TABLET ORAL 2 TIMES DAILY
Qty: 10 TABLET | Refills: 0 | Status: SHIPPED | OUTPATIENT
Start: 2023-11-13 | End: 2023-11-18

## 2023-11-13 ASSESSMENT — ENCOUNTER SYMPTOMS
ABDOMINAL PAIN: 0
SHORTNESS OF BREATH: 0
COUGH: 1
RHINORRHEA: 1
SORE THROAT: 1
CHEST TIGHTNESS: 1

## 2023-11-13 NOTE — ED PROVIDER NOTES
[address]  Urgent Care Encounter       CHIEF COMPLAINT       Chief Complaint   Patient presents with    Cough      Facial pain pressure  onset 2 weeeks        Nurses Notes reviewed and I agree except as noted in the HPI. HISTORY OF PRESENT ILLNESS   Anibal Gonzalez is a 68 y.o. female who presents for sinus congestion, sore throat, chest congestion, cough, ear fullness and ears are popping. Symptoms have been present for 3 weeks. Patient has not been taking anything for treatment of her symptoms. She was hoping her symptoms would improve but states they are not. No shortness of breath. No chest pain. No known fevers. HPI    REVIEW OF SYSTEMS     Review of Systems   Constitutional:  Negative for activity change, diaphoresis, fatigue and fever. HENT:  Positive for congestion, ear pain, rhinorrhea and sore throat. Respiratory:  Positive for cough and chest tightness. Negative for shortness of breath. Cardiovascular:  Negative for chest pain. Gastrointestinal:  Negative for abdominal pain. Neurological:  Positive for headaches. Negative for dizziness.        PAST MEDICAL HISTORY         Diagnosis Date    Arthritis     Atypical chest pain     Chronic back pain     Closed nondisplaced intertrochanteric fracture of left femur ([address]) 08/06/2016    Coronary artery disease involving native coronary artery of native heart 08/06/2016    Dr Kolby Esparza 2022    Fifth cranial nerve palsy     GERD (gastroesophageal reflux disease)     Hiatal hernia     Hyperlipidemia     Hypertension     Left foot drop     s/p lumbar spine surgery in 2011    Migraines     atypical had spasm that closed blood supply to nerve in brain    Numbness     Decreased feeling right arm left leg    PONV (postoperative nausea and vomiting)     Septal defect, heart 2016    SOB (shortness of breath)     Stroke due to occlusion of right cerebellar artery ([address]) 08/07/2016    Stroke-like episode     Secondary

## 2023-11-13 NOTE — DISCHARGE INSTRUCTIONS
Medications as directed until gone    Drink plenty of fluids    Stop smoking    Follow-up primary care provider return here if symptoms do not improve in 3 to 4 days.   Return sooner for new or worsening symptoms

## 2023-11-27 RX ORDER — PITAVASTATIN CALCIUM 4.18 MG/1
TABLET, FILM COATED ORAL
Qty: 90 TABLET | Refills: 3 | OUTPATIENT
Start: 2023-11-27

## 2024-01-03 DIAGNOSIS — E78.5 DYSLIPIDEMIA (HIGH LDL; LOW HDL): Primary | ICD-10-CM

## 2024-01-03 DIAGNOSIS — Z79.899 ON LONG TERM DRUG THERAPY: ICD-10-CM

## 2024-01-03 RX ORDER — CETIRIZINE HYDROCHLORIDE 10 MG/1
10 TABLET ORAL DAILY
Qty: 30 TABLET | Refills: 0 | OUTPATIENT
Start: 2024-01-03

## 2024-01-03 RX ORDER — FLUTICASONE PROPIONATE 50 MCG
2 SPRAY, SUSPENSION (ML) NASAL DAILY
OUTPATIENT
Start: 2024-01-03

## 2024-01-05 RX ORDER — PITAVASTATIN CALCIUM 4.18 MG/1
TABLET, FILM COATED ORAL
Qty: 90 TABLET | Refills: 3 | Status: SHIPPED | OUTPATIENT
Start: 2024-01-05

## 2024-03-27 ENCOUNTER — HOSPITAL ENCOUNTER (OUTPATIENT)
Dept: WOMENS IMAGING | Age: 74
Discharge: HOME | End: 2024-03-27
Payer: MEDICARE

## 2024-03-27 ENCOUNTER — HOSPITAL ENCOUNTER (OUTPATIENT)
Age: 74
Discharge: HOME | End: 2024-03-29
Payer: MEDICARE

## 2024-03-27 VITALS — WEIGHT: 150 LBS | BODY MASS INDEX: 26.57 KG/M2

## 2024-03-27 VITALS
HEIGHT: 63 IN | SYSTOLIC BLOOD PRESSURE: 134 MMHG | WEIGHT: 150 LBS | DIASTOLIC BLOOD PRESSURE: 82 MMHG | BODY MASS INDEX: 26.58 KG/M2

## 2024-03-27 DIAGNOSIS — R00.2 PALPITATIONS: ICD-10-CM

## 2024-03-27 DIAGNOSIS — N95.8 OTHER SPECIFIED MENOPAUSAL AND PERIMENOPAUSAL DISORDERS: ICD-10-CM

## 2024-03-27 DIAGNOSIS — I51.0 ACQUIRED CARDIAC SEPTAL DEFECT: ICD-10-CM

## 2024-03-27 DIAGNOSIS — Z12.31 ENCOUNTER FOR SCREENING MAMMOGRAM FOR MALIGNANT NEOPLASM OF BREAST: ICD-10-CM

## 2024-03-27 DIAGNOSIS — N95.9 UNSPECIFIED MENOPAUSAL AND PERIMENOPAUSAL DISORDER: ICD-10-CM

## 2024-03-27 DIAGNOSIS — N95.9 POSTMENOPAUSAL SYMPTOMS: ICD-10-CM

## 2024-03-27 LAB
ECHO AV CUSP MM: 1.9 CM
ECHO AV PEAK GRADIENT: 5 MMHG
ECHO AV PEAK VELOCITY: 1.1 M/S
ECHO AV VELOCITY RATIO: 0.82
ECHO BSA: 1.74 M2
ECHO EST RA PRESSURE: 5 MMHG
ECHO LA AREA 2C: 12.3 CM2
ECHO LA AREA 4C: 9.3 CM2
ECHO LA DIAMETER INDEX: 1.75 CM/M2
ECHO LA DIAMETER: 3 CM
ECHO LA MAJOR AXIS: 4.1 CM
ECHO LA MINOR AXIS: 4.4 CM
ECHO LA VOL BP: 21 ML (ref 22–52)
ECHO LA VOL MOD A2C: 26 ML (ref 22–52)
ECHO LA VOL MOD A4C: 16 ML (ref 22–52)
ECHO LA VOL/BSA BIPLANE: 12 ML/M2 (ref 16–34)
ECHO LA VOLUME INDEX MOD A2C: 15 ML/M2 (ref 16–34)
ECHO LA VOLUME INDEX MOD A4C: 9 ML/M2 (ref 16–34)
ECHO LV E' LATERAL VELOCITY: 12 CM/S
ECHO LV E' SEPTAL VELOCITY: 9 CM/S
ECHO LV FRACTIONAL SHORTENING: 33 % (ref 28–44)
ECHO LV INTERNAL DIMENSION DIASTOLE INDEX: 2.63 CM/M2
ECHO LV INTERNAL DIMENSION DIASTOLIC: 4.5 CM (ref 3.9–5.3)
ECHO LV INTERNAL DIMENSION SYSTOLIC INDEX: 1.75 CM/M2
ECHO LV INTERNAL DIMENSION SYSTOLIC: 3 CM
ECHO LV ISOVOLUMETRIC RELAXATION TIME (IVRT): 95 MS
ECHO LV IVSD: 0.8 CM (ref 0.6–0.9)
ECHO LV MASS 2D: 104.5 G (ref 67–162)
ECHO LV MASS INDEX 2D: 61.1 G/M2 (ref 43–95)
ECHO LV POSTERIOR WALL DIASTOLIC: 0.7 CM (ref 0.6–0.9)
ECHO LV RELATIVE WALL THICKNESS RATIO: 0.31
ECHO LVOT PEAK GRADIENT: 3 MMHG
ECHO LVOT PEAK VELOCITY: 0.9 M/S
ECHO MV A VELOCITY: 0.94 M/S
ECHO MV E DECELERATION TIME (DT): 278 MS
ECHO MV E VELOCITY: 0.72 M/S
ECHO MV E/A RATIO: 0.77
ECHO MV E/E' LATERAL: 6
ECHO MV E/E' RATIO (AVERAGED): 7
ECHO PV MAX VELOCITY: 0.8 M/S
ECHO PV PEAK GRADIENT: 3 MMHG
ECHO RV INTERNAL DIMENSION: 2.4 CM
ECHO RV TAPSE: 2 CM (ref 1.7–?)
ECHO TV E WAVE: 0.5 M/S

## 2024-03-27 PROCEDURE — 77080 DXA BONE DENSITY AXIAL: CPT

## 2024-03-27 PROCEDURE — 93306 TTE W/DOPPLER COMPLETE: CPT

## 2024-03-27 PROCEDURE — 77063 BREAST TOMOSYNTHESIS BI: CPT

## 2024-04-04 NOTE — TELEPHONE ENCOUNTER
LMOM   Patient called and requested her urine results from 02/16/18. Patient stated that she is still having low back pain, burning sensation, and she has been having sweats on and off. Patient denies frequency, urgency, fever or chills. Patient has been self catheterizing 3 times a day with a urine residual of 1-1.5 ounces and is decreasing to 2 times a day. Please advise. Thank you.

## 2024-04-21 ENCOUNTER — HOSPITAL ENCOUNTER (EMERGENCY)
Age: 74
Discharge: HOME | End: 2024-04-21
Payer: MEDICARE

## 2024-04-21 VITALS
DIASTOLIC BLOOD PRESSURE: 83 MMHG | TEMPERATURE: 97.2 F | SYSTOLIC BLOOD PRESSURE: 144 MMHG | HEART RATE: 59 BPM | OXYGEN SATURATION: 96 % | HEIGHT: 64 IN | WEIGHT: 144 LBS | RESPIRATION RATE: 16 BRPM | BODY MASS INDEX: 24.59 KG/M2

## 2024-04-21 DIAGNOSIS — N39.0 ACUTE UTI (URINARY TRACT INFECTION): Primary | ICD-10-CM

## 2024-04-21 LAB
BILIRUB UR STRIP.AUTO-MCNC: NEGATIVE MG/DL
CHARACTER UR: CLEAR
COLOR: YELLOW
GLUCOSE UR QL STRIP.AUTO: NEGATIVE MG/DL
KETONES UR QL STRIP.AUTO: NEGATIVE
NITRITE UR QL STRIP.AUTO: NEGATIVE
PH UR STRIP.AUTO: 6.5 [PH] (ref 5–9)
PROT UR STRIP.AUTO-MCNC: NEGATIVE MG/DL
RBC #/AREA URNS HPF: ABNORMAL /[HPF]
SP GR UR STRIP.AUTO: 1.01 (ref 1–1.03)
UROBILINOGEN, URINE: 0.2 EU/DL (ref 0.2–1)
WBC #/AREA URNS HPF: ABNORMAL /[HPF]

## 2024-04-21 PROCEDURE — 87086 URINE CULTURE/COLONY COUNT: CPT

## 2024-04-21 PROCEDURE — 81003 URINALYSIS AUTO W/O SCOPE: CPT

## 2024-04-21 PROCEDURE — 87077 CULTURE AEROBIC IDENTIFY: CPT

## 2024-04-21 PROCEDURE — 99213 OFFICE O/P EST LOW 20 MIN: CPT | Performed by: NURSE PRACTITIONER

## 2024-04-21 PROCEDURE — 87186 SC STD MICRODIL/AGAR DIL: CPT

## 2024-04-21 PROCEDURE — 99213 OFFICE O/P EST LOW 20 MIN: CPT

## 2024-04-21 RX ORDER — CEPHALEXIN 500 MG/1
500 CAPSULE ORAL 2 TIMES DAILY
Qty: 14 CAPSULE | Refills: 0 | Status: SHIPPED | OUTPATIENT
Start: 2024-04-21 | End: 2024-04-28

## 2024-04-21 ASSESSMENT — PAIN DESCRIPTION - LOCATION: LOCATION: OTHER (COMMENT)

## 2024-04-21 ASSESSMENT — PAIN DESCRIPTION - PAIN TYPE: TYPE: ACUTE PAIN

## 2024-04-21 ASSESSMENT — PAIN SCALES - GENERAL: PAINLEVEL_OUTOF10: 5

## 2024-04-21 ASSESSMENT — PAIN - FUNCTIONAL ASSESSMENT: PAIN_FUNCTIONAL_ASSESSMENT: 0-10

## 2024-04-21 ASSESSMENT — PAIN DESCRIPTION - DESCRIPTORS: DESCRIPTORS: DISCOMFORT

## 2024-04-21 NOTE — ED PROVIDER NOTES
[facility] URGENT CARE  UrgentCare Encounter      CHIEFCOMPLAINT     No chief complaint on file.      Nurses Notes reviewed and I agree except as noted in the HPI.  HISTORY OF PRESENT ILLNESS   Tiffany Diop is a 74 y.o. female who presents to urgent care with complaints of urinary frequency, urgency, burning.  Symptom onset was approximately 3 days ago.  She denies nausea, vomiting, fevers.    REVIEW OF SYSTEMS     Review of Systems   Genitourinary:  Positive for dysuria, frequency and urgency.       PAST MEDICAL HISTORY         Diagnosis Date    Arthritis     Atypical chest pain     Chronic back pain     Closed nondisplaced intertrochanteric fracture of left femur (HCC) 08/06/2016    Coronary artery disease involving native coronary artery of native heart 08/06/2016    Dr Vic HARPER 2022    Fifth cranial nerve palsy     GERD (gastroesophageal reflux disease)     Hiatal hernia     Hyperlipidemia     Hypertension     Left foot drop     s/p lumbar spine surgery in 2011    Migraines     atypical had spasm that closed blood supply to nerve in brain    Numbness     Decreased feeling right arm left leg    PONV (postoperative nausea and vomiting)     Septal defect, heart 2016    SOB (shortness of breath)     Stroke due to occlusion of right cerebellar artery (HCC) 08/07/2016    Stroke-like episode     Secondary to migraine       SURGICAL HISTORY     Patient  has a past surgical history that includes cardiovascular stress test (2011); Cholecystectomy (over 5 years); Myringotomy Tympanostomy Tube Placement (07/18/2014); back surgery (2011); Hysterectomy (1994); Wrist ganglion excision (Right); Upper gastrointestinal endoscopy (2015); Colonoscopy (last one over 5 years, 2015,2019); hip surgery (Left, 08/06/2016); Cardiac catheterization (2015); other surgical history (11/21/2016); Cardiac catheterization (12/22/2016); Leg Surgery; tumor excision (10/2017); EGD; Bunionectomy (Right); transesophageal  echocardiogram (N/A, 06/28/2023); CT BIOPSY LYMPH NODES SUPERFICIAL (09/12/2017); and Ovary removal (1994).    CURRENT MEDICATIONS       Previous Medications    ALPRAZOLAM (XANAX) 0.25 MG TABLET    Take 1 tablet by mouth nightly as needed for Sleep.    ASPIRIN 81 MG EC TABLET    Take 1 tablet by mouth daily    COENZYME Q10 (COQ-10) 100 MG CAPS    Take 1 capsule by mouth daily    DEXTROMETHORPHAN-GUAIFENESIN (CORICIDIN HBP CONGESTION/COUGH)  MG CAPS    Take 1 capsule by mouth every 6 hours as needed (cough/congestion)    FLUTICASONE (FLONASE) 50 MCG/ACT NASAL SPRAY    2 sprays by Each Nostril route daily    FOLIC ACID (FOLVITE) 1 MG TABLET    Take 1 tablet by mouth daily    LINZESS 145 MCG CAPSULE    TAKE 1 CAPSULE BY MOUTH EVERY DAY IN THE MORNING BEFORE BREAKFAST    LIVALO 4 MG TABS TABLET    TAKE 1 TABLET BY MOUTH EVERY DAY    METHOCARBAMOL (ROBAXIN) 750 MG TABLET    Take 1 tablet by mouth daily    MULTIPLE VITAMINS-MINERALS (MULTIVITAMIN ADULTS PO)    Take 1 tablet by mouth daily    NITROGLYCERIN (NITROSTAT) 0.4 MG SL TABLET    Place 0.4 mg under the tongue every 5 minutes as needed for Chest pain.    OMEPRAZOLE (PRILOSEC) 40 MG DELAYED RELEASE CAPSULE    Take 1 capsule by mouth in the morning and at bedtime    PROBIOTIC PRODUCT (PROBIOTIC ADVANCED PO)    Take 1 tablet by mouth daily    SENNA (SENOKOT) 8.6 MG TABLET    Take 1 tablet by mouth 2 times daily    SPIRONOLACTONE (ALDACTONE) 25 MG TABLET    TAKE 1 TABLET BY MOUTH EVERY DAY    SUCRALFATE (CARAFATE) 1 GM TABLET    Take 1 tablet by mouth 2 times daily as needed (abdominal pain)       ALLERGIES     Patient is is allergic to bupropion and zofran [ondansetron hcl].    FAMILY HISTORY     Patient'sfamily history includes Alzheimer's Disease in her mother; Cancer in her paternal grandmother; Cancer (age of onset: 79) in her father; Colon Cancer (age of onset: 70) in her father; Colon Polyps in her brother; Diabetes in her brother, father, mother, and

## 2024-04-21 NOTE — DISCHARGE INSTRUCTIONS
Please take your full course of oral antibiotics.  Make sure that you are drinking plenty of water.  We will contact you with your urine culture results in approximately 24-48 hours.  Report to the ER with new or severe symptoms.

## 2024-04-21 NOTE — ED TRIAGE NOTES
C/O urinary frequency, urgency and slight dysuria x 2 days increasingly worse. Clean catch U/A sent to lab.

## 2024-04-21 NOTE — ED NOTES
Discharge instructions and prescriptions reviewed with pt. Pt verbalized understanding. Pt ambulated out in stable condition.  Assessment unchanged upon discharge.       Anastasiya Stovall RN  04/21/24 [id number]

## 2024-04-24 LAB
BACTERIA UR CULT: ABNORMAL
ORGANISM: ABNORMAL

## 2024-04-25 ENCOUNTER — HOSPITAL ENCOUNTER (EMERGENCY)
Age: 74
Discharge: HOME | End: 2024-04-25
Payer: MEDICARE

## 2024-04-25 ENCOUNTER — APPOINTMENT (OUTPATIENT)
Dept: GENERAL RADIOLOGY | Age: 74
End: 2024-04-25
Payer: MEDICARE

## 2024-04-25 VITALS
HEART RATE: 88 BPM | OXYGEN SATURATION: 98 % | DIASTOLIC BLOOD PRESSURE: 66 MMHG | BODY MASS INDEX: 25.34 KG/M2 | TEMPERATURE: 98 F | WEIGHT: 143 LBS | RESPIRATION RATE: 16 BRPM | SYSTOLIC BLOOD PRESSURE: 158 MMHG | HEIGHT: 63 IN

## 2024-04-25 DIAGNOSIS — S92.912A FRACTURE OF PROXIMAL PHALANX OF TOE OF LEFT FOOT: Primary | ICD-10-CM

## 2024-04-25 PROCEDURE — 73630 X-RAY EXAM OF FOOT: CPT

## 2024-04-25 PROCEDURE — 99213 OFFICE O/P EST LOW 20 MIN: CPT

## 2024-04-25 PROCEDURE — 99213 OFFICE O/P EST LOW 20 MIN: CPT | Performed by: NURSE PRACTITIONER

## 2024-04-25 ASSESSMENT — ENCOUNTER SYMPTOMS
COUGH: 0
DIARRHEA: 0
NAUSEA: 0
RHINORRHEA: 0
CHEST TIGHTNESS: 0
SORE THROAT: 0
SHORTNESS OF BREATH: 0

## 2024-04-25 ASSESSMENT — PAIN - FUNCTIONAL ASSESSMENT: PAIN_FUNCTIONAL_ASSESSMENT: 0-10

## 2024-04-25 ASSESSMENT — PAIN SCALES - GENERAL: PAINLEVEL_OUTOF10: 3

## 2024-04-25 ASSESSMENT — PAIN DESCRIPTION - DESCRIPTORS: DESCRIPTORS: ACHING

## 2024-04-25 ASSESSMENT — PAIN DESCRIPTION - PAIN TYPE: TYPE: ACUTE PAIN

## 2024-04-25 NOTE — ED PROVIDER NOTES
[facility] URGENT CARE  Urgent Care Encounter       CHIEF COMPLAINT       Chief Complaint   Patient presents with    Foot Injury       Nurses Notes reviewed and I agree except as noted in the HPI.  HISTORY OF PRESENT ILLNESS   Tiffany Diop is a 74 y.o. female who presents to the [facility] urgent care for evaluation of a left foot injury.  She reports shortly prior to arrival that she dropped a can of tomato soup on her foot.  She is complaining of second and third digit pain.  She has noted to have ecchymosis and edema.    The history is provided by the patient. No  was used.       REVIEW OF SYSTEMS     Review of Systems   Constitutional:  Negative for activity change, appetite change, chills, fatigue and fever.   HENT:  Negative for ear discharge, ear pain, rhinorrhea and sore throat.    Respiratory:  Negative for cough, chest tightness and shortness of breath.    Cardiovascular:  Negative for chest pain.   Gastrointestinal:  Negative for diarrhea, nausea and vomiting.   Genitourinary:  Negative for dysuria.   Musculoskeletal:  Positive for arthralgias.   Skin:  Negative for rash.   Allergic/Immunologic: Negative for environmental allergies and food allergies.   Neurological:  Negative for dizziness and headaches.       PAST MEDICAL HISTORY         Diagnosis Date    Arthritis     Atypical chest pain     Chronic back pain     Closed nondisplaced intertrochanteric fracture of left femur (HCC) 08/06/2016    Coronary artery disease involving native coronary artery of native heart 08/06/2016    Dr Vic HARPER 2022    Fifth cranial nerve palsy     GERD (gastroesophageal reflux disease)     Hiatal hernia     Hyperlipidemia     Hypertension     Left foot drop     s/p lumbar spine surgery in 2011    Migraines     atypical had spasm that closed blood supply to nerve in brain    Numbness     Decreased feeling right arm left leg    PONV (postoperative nausea and vomiting)     Septal  defect, heart 2016    SOB (shortness of breath)     Stroke due to occlusion of right cerebellar artery (HCC) 08/07/2016    Stroke-like episode     Secondary to migraine       SURGICALHISTORY     Patient  has a past surgical history that includes cardiovascular stress test (2011); Cholecystectomy (over 5 years); Myringotomy Tympanostomy Tube Placement (07/18/2014); back surgery (2011); Hysterectomy (1994); Wrist ganglion excision (Right); Upper gastrointestinal endoscopy (2015); Colonoscopy (last one over 5 years, 2015,2019); hip surgery (Left, 08/06/2016); Cardiac catheterization (2015); other surgical history (11/21/2016); Cardiac catheterization (12/22/2016); Leg Surgery; tumor excision (10/2017); EGD; Bunionectomy (Right); transesophageal echocardiogram (N/A, 06/28/2023); CT BIOPSY LYMPH NODES SUPERFICIAL (09/12/2017); and Ovary removal (1994).    CURRENT MEDICATIONS       Previous Medications    ALPRAZOLAM (XANAX) 0.25 MG TABLET    Take 1 tablet by mouth nightly as needed for Sleep.    ASPIRIN 81 MG EC TABLET    Take 1 tablet by mouth daily    CEPHALEXIN (KEFLEX) 500 MG CAPSULE    Take 1 capsule by mouth 2 times daily for 7 days    COENZYME Q10 (COQ-10) 100 MG CAPS    Take 1 capsule by mouth daily    DEXTROMETHORPHAN-GUAIFENESIN (CORICIDIN HBP CONGESTION/COUGH)  MG CAPS    Take 1 capsule by mouth every 6 hours as needed (cough/congestion)    FLUTICASONE (FLONASE) 50 MCG/ACT NASAL SPRAY    2 sprays by Each Nostril route daily    FOLIC ACID (FOLVITE) 1 MG TABLET    Take 1 tablet by mouth daily    LINACLOTIDE (LINZESS) 145 MCG CAPSULE    Take 1 capsule by mouth every morning (before breakfast)    LIVALO 4 MG TABS TABLET    TAKE 1 TABLET BY MOUTH EVERY DAY    METHOCARBAMOL (ROBAXIN) 750 MG TABLET    Take 1 tablet by mouth daily    MULTIPLE VITAMINS-MINERALS (MULTIVITAMIN ADULTS PO)    Take 1 tablet by mouth daily    NITROGLYCERIN (NITROSTAT) 0.4 MG SL TABLET    Place 0.4 mg under the tongue every 5 minutes as

## 2024-04-25 NOTE — ED TRIAGE NOTES
Ambulatory to room 3 with . Here for left foot pain states she drop a 10ounce can of tomato sauce on her foot now very painful when walking. Pulses present. Pt foot is discolored states that is sunburn. Compared to right foot look the same

## 2024-05-08 ENCOUNTER — OFFICE VISIT (OUTPATIENT)
Dept: CARDIOLOGY CLINIC | Age: 74
End: 2024-05-08
Payer: MEDICARE

## 2024-05-08 VITALS
SYSTOLIC BLOOD PRESSURE: 140 MMHG | BODY MASS INDEX: 25.3 KG/M2 | HEART RATE: 58 BPM | DIASTOLIC BLOOD PRESSURE: 78 MMHG | WEIGHT: 142.8 LBS | HEIGHT: 63 IN

## 2024-05-08 DIAGNOSIS — Q21.12 PFO (PATENT FORAMEN OVALE): Primary | ICD-10-CM

## 2024-05-08 PROCEDURE — 1123F ACP DISCUSS/DSCN MKR DOCD: CPT | Performed by: INTERNAL MEDICINE

## 2024-05-08 PROCEDURE — 99213 OFFICE O/P EST LOW 20 MIN: CPT | Performed by: INTERNAL MEDICINE

## 2024-05-08 RX ORDER — MELOXICAM 15 MG
TABLET ORAL
COMMUNITY
Start: 2024-04-17

## 2024-05-08 NOTE — PATIENT INSTRUCTIONS
Your nurses today were MARILEE Murray and PABLITO Rae  Your provider today was Dr. Yen  Phone number: [phone]      You may receive a survey regarding the care you received during your visit.  Your input is valuable to us.  We encourage you to complete and return your survey.  We hope you will choose us in the future for your healthcare needs.

## 2024-05-08 NOTE — PROGRESS NOTES
Ht 1.6 m (5' 3\")   Wt 64.8 kg (142 lb 12.8 oz)   LMP 11/06/1982   BMI 25.30 kg/m²     Wt Readings from Last 3 Encounters:   05/08/24 64.8 kg (142 lb 12.8 oz)   04/25/24 64.9 kg (143 lb)   04/21/24 65.3 kg (144 lb)     BP Readings from Last 3 Encounters:   05/08/24 (!) 140/78   04/25/24 (!) 158/66   04/21/24 (!) 144/83       Nursing note and vitals reviewed.    Physical Exam   Constitutional: Oriented to person, place, and time. Appears well-developed and well-nourished.   HENT:   Head: Normocephalic and atraumatic.   Eyes: EOM are normal. Pupils are equal, round, and reactive to light.   Neck: Normal range of motion. Neck supple. No JVD present.   Cardiovascular: Normal rate, regular rhythm, normal heart sounds and intact distal pulses.    No murmur heard.  Pulmonary/Chest: Effort normal and breath sounds normal. No respiratory distress. No wheezes. No rales.   Abdominal: Soft. Bowel sounds are normal. No distension. There is no tenderness.   Musculoskeletal: Normal range of motion. No edema.   Neurological: Alert and oriented to person, place, and time. No cranial nerve deficit. Coordination normal.   Skin: Skin is warm and dry.   Psychiatric: Normal mood and affect.       Lab Results   Component Value Date/Time    CKTOTAL 120 03/03/2015 03:28 PM       Lab Results   Component Value Date/Time     04/16/2020 06:00 AM    WBC 6.9 12/27/2019 07:18 AM    RBC 1 04/16/2020 06:00 AM    HGB 14.8 12/27/2019 07:18 AM    HGB 14.8 08/04/2011 12:30 PM    HCT 45.7 12/27/2019 07:18 AM    .5 12/27/2019 07:18 AM    MCH 33.2 12/27/2019 07:18 AM    MCHC 32.4 12/27/2019 07:18 AM    RDW 12.7 01/22/2017 05:31 PM     12/27/2019 07:18 AM    MPV 10.4 12/27/2019 07:18 AM       Lab Results   Component Value Date/Time     12/27/2019 07:18 AM    K 4.0 12/27/2019 07:18 AM    K 3.9 05/15/2019 05:10 PM     12/27/2019 07:18 AM    CO2 25 12/27/2019 07:18 AM    BUN 12 12/27/2019 07:18 AM    CREATININE 0.7 12/27/2019

## 2024-11-11 ENCOUNTER — TELEPHONE (OUTPATIENT)
Dept: CARDIOLOGY CLINIC | Age: 74
End: 2024-11-11

## 2024-11-11 NOTE — TELEPHONE ENCOUNTER
Pre op Risk Assessment    Procedure COLONOSCOPY  Physician   Date of surgery/procedure 12/10/24    Last OV 05/08/24  Last Stress ??  Last Echo 03/27/24  Last Cath 04/20/2015  Last Stent 04/20/15  Is patient on blood thinners ASA  Hold Meds/how many days 3-5 Days    FAX: [phone]

## 2024-11-12 NOTE — TELEPHONE ENCOUNTER
Pt was evaluated in office on 5/8/2024 then patient to follow-up PRN, so no further appointments.   Is patient still clear?

## 2025-02-24 ENCOUNTER — OFFICE VISIT (OUTPATIENT)
Dept: CARDIOLOGY CLINIC | Age: 75
End: 2025-02-24
Payer: MEDICARE

## 2025-02-24 VITALS
HEIGHT: 64 IN | HEART RATE: 80 BPM | SYSTOLIC BLOOD PRESSURE: 122 MMHG | BODY MASS INDEX: 22.23 KG/M2 | DIASTOLIC BLOOD PRESSURE: 59 MMHG | WEIGHT: 130.2 LBS

## 2025-02-24 DIAGNOSIS — I70.213 ATHEROSCLEROSIS OF NATIVE ARTERY OF BOTH LOWER EXTREMITIES WITH INTERMITTENT CLAUDICATION: Primary | ICD-10-CM

## 2025-02-24 PROCEDURE — 1123F ACP DISCUSS/DSCN MKR DOCD: CPT | Performed by: INTERNAL MEDICINE

## 2025-02-24 PROCEDURE — 1159F MED LIST DOCD IN RCRD: CPT | Performed by: INTERNAL MEDICINE

## 2025-02-24 PROCEDURE — 99214 OFFICE O/P EST MOD 30 MIN: CPT | Performed by: INTERNAL MEDICINE

## 2025-02-24 PROCEDURE — 99407 BEHAV CHNG SMOKING > 10 MIN: CPT | Performed by: INTERNAL MEDICINE

## 2025-02-24 NOTE — PROGRESS NOTES
[facility] [place] [facility]  [facility] CARDIOLOGY  [address]  [address] [place] [id number]  Dept: [phone]  Dept Fax: [phone]  Loc: [phone]    Visit Date: 2/24/2025    Ms. Diop is a 75 y.o. female  who presented for:  Chief Complaint   Patient presents with    Check-Up     Discuss left foot discoloration and decreased pulses    Hypertension       HPI:     History of Present Illness  The patient is a 75-year-old female who presents for evaluation of her foot.    She reports a change in color of her left foot, which has been ongoing for an unspecified duration. She describes a sensation of numbness in her feet, accompanied by mild tingling, but overall, she has diminished sensation. This symptom has been present for approximately 6 to 8 months. Her sleep is not disturbed by these symptoms. She notes that her toenails continue to grow, and she maintains regular nail care. She also reports hair growth on her legs, which she shaves regularly. She perceives her left foot to be colder than her right. She has no history of arterial procedures on her left leg. She has a history of a stroke 8 to 9 years ago, which affected her right leg. Additionally, she mentions a past hip fracture due to a fall.  She does not necessarily have claudication but she states she cannot feel her feet well.     She continues to smoke, although less than half a pack per day, and expresses a desire to quit without experiencing severe withdrawal symptoms. She has previously attempted to quit using nicotine patches and gum.    SOCIAL HISTORY  The patient admits to smoking less than half a pack a day and expresses a desire to quit.    MEDICATIONS  aspirin 325 mg enteric               Current Outpatient Medications:     linaclotide (LINZESS) 145 MCG capsule, Take 1 capsule by mouth every morning (before breakfast), Disp: 90 capsule, Rfl: 3    MOBIC 15 MG tablet, 1 tab(s) orally once a day for 30

## 2025-02-26 ENCOUNTER — HOSPITAL ENCOUNTER (OUTPATIENT)
Dept: INTERVENTIONAL RADIOLOGY/VASCULAR | Age: 75
Discharge: HOME | End: 2025-02-28
Attending: INTERNAL MEDICINE
Payer: MEDICARE

## 2025-02-26 DIAGNOSIS — I70.213 ATHEROSCLEROSIS OF NATIVE ARTERY OF BOTH LOWER EXTREMITIES WITH INTERMITTENT CLAUDICATION: ICD-10-CM

## 2025-02-26 PROCEDURE — 93925 LOWER EXTREMITY STUDY: CPT

## 2025-03-05 ENCOUNTER — TRANSCRIBE ORDERS (OUTPATIENT)
Dept: ADMINISTRATIVE | Age: 75
End: 2025-03-05

## 2025-03-05 DIAGNOSIS — Z87.891 PERSONAL HISTORY OF TOBACCO USE: Primary | ICD-10-CM

## 2025-06-04 ENCOUNTER — TRANSCRIBE ORDERS (OUTPATIENT)
Dept: ADMINISTRATIVE | Age: 75
End: 2025-06-04

## 2025-06-04 DIAGNOSIS — R45.1 AGITATION: Primary | ICD-10-CM

## 2025-06-04 DIAGNOSIS — R41.3 MEMORY LOSS: ICD-10-CM

## 2025-07-09 ENCOUNTER — HOSPITAL ENCOUNTER (OUTPATIENT)
Dept: MRI IMAGING | Age: 75
Discharge: HOME | End: 2025-07-09
Attending: FAMILY MEDICINE
Payer: MEDICARE

## 2025-07-09 DIAGNOSIS — R45.1 AGITATION: ICD-10-CM

## 2025-07-09 DIAGNOSIS — R41.3 MEMORY LOSS: ICD-10-CM

## 2025-07-09 PROCEDURE — 70551 MRI BRAIN STEM W/O DYE: CPT

## (undated) DEVICE — GLOVE ORTHO 8   MSG9480